# Patient Record
Sex: MALE | Race: WHITE | URBAN - METROPOLITAN AREA
[De-identification: names, ages, dates, MRNs, and addresses within clinical notes are randomized per-mention and may not be internally consistent; named-entity substitution may affect disease eponyms.]

---

## 2019-03-27 ENCOUNTER — TRANSFERRED RECORDS (OUTPATIENT)
Dept: HEALTH INFORMATION MANAGEMENT | Facility: CLINIC | Age: 64
End: 2019-03-27

## 2019-04-01 DIAGNOSIS — R91.8 PULMONARY NODULES: Primary | ICD-10-CM

## 2019-04-09 ENCOUNTER — TRANSFERRED RECORDS (OUTPATIENT)
Dept: HEALTH INFORMATION MANAGEMENT | Facility: CLINIC | Age: 64
End: 2019-04-09

## 2019-04-09 LAB
ALT SERPL-CCNC: 21 IU/L (ref 8–45)
AST SERPL-CCNC: 36 IU/L (ref 2–40)
CREAT SERPL-MCNC: 1.15 MG/DL (ref 0.72–1.25)
GFR SERPL CREATININE-BSD FRML MDRD: >60 ML/MIN/1.73M2
GLUCOSE SERPL-MCNC: 85 MG/DL (ref 65–100)
POTASSIUM SERPL-SCNC: 4 MMOL/L (ref 3.5–5)

## 2019-04-11 ENCOUNTER — HOSPITAL ENCOUNTER (OUTPATIENT)
Dept: CARDIAC REHAB | Facility: CLINIC | Age: 64
End: 2019-04-11
Attending: THORACIC SURGERY (CARDIOTHORACIC VASCULAR SURGERY)
Payer: COMMERCIAL

## 2019-04-11 DIAGNOSIS — R91.8 PULMONARY NODULES: ICD-10-CM

## 2019-04-11 PROCEDURE — 94729 DIFFUSING CAPACITY: CPT

## 2019-04-11 PROCEDURE — 40001038 ZZH STATISTIC RESPIRATORY TESTING VISIT

## 2019-04-11 PROCEDURE — 94375 RESPIRATORY FLOW VOLUME LOOP: CPT

## 2019-04-11 PROCEDURE — 94726 PLETHYSMOGRAPHY LUNG VOLUMES: CPT

## 2019-04-12 LAB
DLCOCOR-%PRED-PRE: 137 %
DLCOCOR-PRE: 36.67 ML/MIN/MMHG
DLCOUNC-%PRED-PRE: 141 %
DLCOUNC-PRE: 37.76 ML/MIN/MMHG
DLCOUNC-PRED: 26.61 ML/MIN/MMHG
ERV-%PRED-PRE: 89 %
ERV-PRE: 0.92 L
ERV-PRED: 1.02 L
EXPTIME-PRE: 7.34 SEC
FEF2575-%PRED-PRE: 82 %
FEF2575-PRE: 2.26 L/SEC
FEF2575-PRED: 2.74 L/SEC
FEFMAX-%PRED-PRE: 94 %
FEFMAX-PRE: 8.35 L/SEC
FEFMAX-PRED: 8.82 L/SEC
FEV1-%PRED-PRE: 92 %
FEV1-PRE: 3.15 L
FEV1FEV6-PRE: 74 %
FEV1FEV6-PRED: 78 %
FEV1FVC-PRE: 73 %
FEV1FVC-PRED: 77 %
FEV1SVC-PRE: 78 %
FEV1SVC-PRED: 70 %
FIFMAX-PRE: 4.62 L/SEC
FRCPLETH-%PRED-PRE: 102 %
FRCPLETH-PRE: 3.7 L
FRCPLETH-PRED: 3.62 L
FVC-%PRED-PRE: 97 %
FVC-PRE: 4.31 L
FVC-PRED: 4.42 L
IC-%PRED-PRE: 82 %
IC-PRE: 3.13 L
IC-PRED: 3.81 L
RVPLETH-%PRED-PRE: 110 %
RVPLETH-PRE: 2.77 L
RVPLETH-PRED: 2.49 L
TLCPLETH-%PRED-PRE: 96 %
TLCPLETH-PRE: 6.83 L
TLCPLETH-PRED: 7.05 L
VA-%PRED-PRE: 106 %
VA-PRE: 6.8 L
VC-%PRED-PRE: 84 %
VC-PRE: 4.06 L
VC-PRED: 4.83 L

## 2019-04-15 ENCOUNTER — TRANSFERRED RECORDS (OUTPATIENT)
Dept: HEALTH INFORMATION MANAGEMENT | Facility: CLINIC | Age: 64
End: 2019-04-15

## 2019-04-15 RX ORDER — HYDROCODONE BITARTRATE AND ACETAMINOPHEN 5; 325 MG/1; MG/1
1 TABLET ORAL EVERY 4 HOURS PRN
COMMUNITY

## 2019-04-15 RX ORDER — TRAZODONE HYDROCHLORIDE 50 MG/1
25 TABLET, FILM COATED ORAL
Status: ON HOLD | COMMUNITY
End: 2019-04-16

## 2019-04-15 RX ORDER — ASPIRIN 81 MG/1
81 TABLET ORAL DAILY
COMMUNITY

## 2019-04-15 RX ORDER — CYCLOBENZAPRINE HCL 10 MG
10 TABLET ORAL 3 TIMES DAILY PRN
COMMUNITY

## 2019-04-15 RX ORDER — METRONIDAZOLE 7.5 MG/G
GEL TOPICAL 2 TIMES DAILY
COMMUNITY

## 2019-04-15 RX ORDER — CARBIDOPA AND LEVODOPA 25; 100 MG/1; MG/1
1 TABLET ORAL 3 TIMES DAILY
COMMUNITY

## 2019-04-15 RX ORDER — ALPRAZOLAM 0.5 MG
0.5 TABLET ORAL 3 TIMES DAILY PRN
COMMUNITY

## 2019-04-15 RX ORDER — ROSUVASTATIN CALCIUM 40 MG/1
40 TABLET, COATED ORAL AT BEDTIME
COMMUNITY

## 2019-04-16 ENCOUNTER — ANESTHESIA (OUTPATIENT)
Dept: SURGERY | Facility: CLINIC | Age: 64
DRG: 165 | End: 2019-04-16
Payer: COMMERCIAL

## 2019-04-16 ENCOUNTER — HOSPITAL ENCOUNTER (INPATIENT)
Facility: CLINIC | Age: 64
LOS: 1 days | Discharge: HOME | DRG: 165 | End: 2019-04-17
Attending: THORACIC SURGERY (CARDIOTHORACIC VASCULAR SURGERY) | Admitting: THORACIC SURGERY (CARDIOTHORACIC VASCULAR SURGERY)
Payer: COMMERCIAL

## 2019-04-16 ENCOUNTER — APPOINTMENT (OUTPATIENT)
Dept: GENERAL RADIOLOGY | Facility: CLINIC | Age: 64
DRG: 165 | End: 2019-04-16
Attending: THORACIC SURGERY (CARDIOTHORACIC VASCULAR SURGERY)
Payer: COMMERCIAL

## 2019-04-16 ENCOUNTER — ANESTHESIA EVENT (OUTPATIENT)
Dept: SURGERY | Facility: CLINIC | Age: 64
DRG: 165 | End: 2019-04-16
Payer: COMMERCIAL

## 2019-04-16 DIAGNOSIS — R91.1 NODULE OF LOWER LOBE OF RIGHT LUNG: Primary | ICD-10-CM

## 2019-04-16 LAB
ABO + RH BLD: NORMAL
ABO + RH BLD: NORMAL
ANION GAP SERPL CALCULATED.3IONS-SCNC: 3 MMOL/L (ref 3–14)
BLD GP AB SCN SERPL QL: NORMAL
BLOOD BANK CMNT PATIENT-IMP: NORMAL
BUN SERPL-MCNC: 15 MG/DL (ref 7–30)
CALCIUM SERPL-MCNC: 8.9 MG/DL (ref 8.5–10.1)
CHLORIDE SERPL-SCNC: 105 MMOL/L (ref 94–109)
CO2 SERPL-SCNC: 29 MMOL/L (ref 20–32)
CREAT SERPL-MCNC: 0.86 MG/DL (ref 0.66–1.25)
ERYTHROCYTE [DISTWIDTH] IN BLOOD BY AUTOMATED COUNT: 12.7 % (ref 10–15)
GFR SERPL CREATININE-BSD FRML MDRD: >90 ML/MIN/{1.73_M2}
GLUCOSE SERPL-MCNC: 93 MG/DL (ref 70–99)
HCT VFR BLD AUTO: 43.3 % (ref 40–53)
HGB BLD-MCNC: 14.9 G/DL (ref 13.3–17.7)
INR PPP: 1 (ref 0.86–1.14)
KOH PREP SPEC: NORMAL
MCH RBC QN AUTO: 33.9 PG (ref 26.5–33)
MCHC RBC AUTO-ENTMCNC: 34.4 G/DL (ref 31.5–36.5)
MCV RBC AUTO: 99 FL (ref 78–100)
PLATELET # BLD AUTO: 203 10E9/L (ref 150–450)
POTASSIUM SERPL-SCNC: 4.4 MMOL/L (ref 3.4–5.3)
RBC # BLD AUTO: 4.39 10E12/L (ref 4.4–5.9)
SODIUM SERPL-SCNC: 137 MMOL/L (ref 133–144)
SPECIMEN EXP DATE BLD: NORMAL
SPECIMEN SOURCE: NORMAL
WBC # BLD AUTO: 6.6 10E9/L (ref 4–11)

## 2019-04-16 PROCEDURE — 86900 BLOOD TYPING SEROLOGIC ABO: CPT | Performed by: THORACIC SURGERY (CARDIOTHORACIC VASCULAR SURGERY)

## 2019-04-16 PROCEDURE — 37000008 ZZH ANESTHESIA TECHNICAL FEE, 1ST 30 MIN: Performed by: THORACIC SURGERY (CARDIOTHORACIC VASCULAR SURGERY)

## 2019-04-16 PROCEDURE — 87015 SPECIMEN INFECT AGNT CONCNTJ: CPT | Performed by: THORACIC SURGERY (CARDIOTHORACIC VASCULAR SURGERY)

## 2019-04-16 PROCEDURE — 25000125 ZZHC RX 250: Performed by: THORACIC SURGERY (CARDIOTHORACIC VASCULAR SURGERY)

## 2019-04-16 PROCEDURE — 71000013 ZZH RECOVERY PHASE 1 LEVEL 1 EA ADDTL HR: Performed by: THORACIC SURGERY (CARDIOTHORACIC VASCULAR SURGERY)

## 2019-04-16 PROCEDURE — 36415 COLL VENOUS BLD VENIPUNCTURE: CPT | Performed by: THORACIC SURGERY (CARDIOTHORACIC VASCULAR SURGERY)

## 2019-04-16 PROCEDURE — 25000132 ZZH RX MED GY IP 250 OP 250 PS 637: Performed by: PHYSICIAN ASSISTANT

## 2019-04-16 PROCEDURE — 88331 PATH CONSLTJ SURG 1 BLK 1SPC: CPT | Performed by: THORACIC SURGERY (CARDIOTHORACIC VASCULAR SURGERY)

## 2019-04-16 PROCEDURE — 12000000 ZZH R&B MED SURG/OB

## 2019-04-16 PROCEDURE — 88307 TISSUE EXAM BY PATHOLOGIST: CPT | Mod: 26 | Performed by: THORACIC SURGERY (CARDIOTHORACIC VASCULAR SURGERY)

## 2019-04-16 PROCEDURE — 25000128 H RX IP 250 OP 636: Performed by: ANESTHESIOLOGY

## 2019-04-16 PROCEDURE — 88312 SPECIAL STAINS GROUP 1: CPT | Mod: 26,59 | Performed by: THORACIC SURGERY (CARDIOTHORACIC VASCULAR SURGERY)

## 2019-04-16 PROCEDURE — 36000063 ZZH SURGERY LEVEL 4 EA 15 ADDTL MIN: Performed by: THORACIC SURGERY (CARDIOTHORACIC VASCULAR SURGERY)

## 2019-04-16 PROCEDURE — 25000128 H RX IP 250 OP 636: Performed by: PHYSICIAN ASSISTANT

## 2019-04-16 PROCEDURE — 40000169 ZZH STATISTIC PRE-PROCEDURE ASSESSMENT I: Performed by: THORACIC SURGERY (CARDIOTHORACIC VASCULAR SURGERY)

## 2019-04-16 PROCEDURE — 0BBF4ZZ EXCISION OF RIGHT LOWER LUNG LOBE, PERCUTANEOUS ENDOSCOPIC APPROACH: ICD-10-PCS | Performed by: THORACIC SURGERY (CARDIOTHORACIC VASCULAR SURGERY)

## 2019-04-16 PROCEDURE — 86901 BLOOD TYPING SEROLOGIC RH(D): CPT | Performed by: THORACIC SURGERY (CARDIOTHORACIC VASCULAR SURGERY)

## 2019-04-16 PROCEDURE — 87102 FUNGUS ISOLATION CULTURE: CPT | Performed by: THORACIC SURGERY (CARDIOTHORACIC VASCULAR SURGERY)

## 2019-04-16 PROCEDURE — 27210794 ZZH OR GENERAL SUPPLY STERILE: Performed by: THORACIC SURGERY (CARDIOTHORACIC VASCULAR SURGERY)

## 2019-04-16 PROCEDURE — 25000566 ZZH SEVOFLURANE, EA 15 MIN: Performed by: THORACIC SURGERY (CARDIOTHORACIC VASCULAR SURGERY)

## 2019-04-16 PROCEDURE — 25000128 H RX IP 250 OP 636: Performed by: THORACIC SURGERY (CARDIOTHORACIC VASCULAR SURGERY)

## 2019-04-16 PROCEDURE — 25000125 ZZHC RX 250: Performed by: NURSE ANESTHETIST, CERTIFIED REGISTERED

## 2019-04-16 PROCEDURE — 87206 SMEAR FLUORESCENT/ACID STAI: CPT | Performed by: THORACIC SURGERY (CARDIOTHORACIC VASCULAR SURGERY)

## 2019-04-16 PROCEDURE — 25000128 H RX IP 250 OP 636: Performed by: NURSE ANESTHETIST, CERTIFIED REGISTERED

## 2019-04-16 PROCEDURE — 37000009 ZZH ANESTHESIA TECHNICAL FEE, EACH ADDTL 15 MIN: Performed by: THORACIC SURGERY (CARDIOTHORACIC VASCULAR SURGERY)

## 2019-04-16 PROCEDURE — 25800030 ZZH RX IP 258 OP 636: Performed by: NURSE ANESTHETIST, CERTIFIED REGISTERED

## 2019-04-16 PROCEDURE — 88307 TISSUE EXAM BY PATHOLOGIST: CPT | Performed by: THORACIC SURGERY (CARDIOTHORACIC VASCULAR SURGERY)

## 2019-04-16 PROCEDURE — 25000132 ZZH RX MED GY IP 250 OP 250 PS 637: Performed by: THORACIC SURGERY (CARDIOTHORACIC VASCULAR SURGERY)

## 2019-04-16 PROCEDURE — 88312 SPECIAL STAINS GROUP 1: CPT | Performed by: THORACIC SURGERY (CARDIOTHORACIC VASCULAR SURGERY)

## 2019-04-16 PROCEDURE — 40000986 XR CHEST PORT 1 VW

## 2019-04-16 PROCEDURE — 87210 SMEAR WET MOUNT SALINE/INK: CPT | Performed by: THORACIC SURGERY (CARDIOTHORACIC VASCULAR SURGERY)

## 2019-04-16 PROCEDURE — 25000132 ZZH RX MED GY IP 250 OP 250 PS 637: Performed by: ANESTHESIOLOGY

## 2019-04-16 PROCEDURE — 87116 MYCOBACTERIA CULTURE: CPT | Performed by: THORACIC SURGERY (CARDIOTHORACIC VASCULAR SURGERY)

## 2019-04-16 PROCEDURE — 80048 BASIC METABOLIC PNL TOTAL CA: CPT | Performed by: THORACIC SURGERY (CARDIOTHORACIC VASCULAR SURGERY)

## 2019-04-16 PROCEDURE — 85027 COMPLETE CBC AUTOMATED: CPT | Performed by: THORACIC SURGERY (CARDIOTHORACIC VASCULAR SURGERY)

## 2019-04-16 PROCEDURE — 85610 PROTHROMBIN TIME: CPT | Performed by: THORACIC SURGERY (CARDIOTHORACIC VASCULAR SURGERY)

## 2019-04-16 PROCEDURE — 71000012 ZZH RECOVERY PHASE 1 LEVEL 1 FIRST HR: Performed by: THORACIC SURGERY (CARDIOTHORACIC VASCULAR SURGERY)

## 2019-04-16 PROCEDURE — 88331 PATH CONSLTJ SURG 1 BLK 1SPC: CPT | Mod: 26 | Performed by: THORACIC SURGERY (CARDIOTHORACIC VASCULAR SURGERY)

## 2019-04-16 PROCEDURE — 86850 RBC ANTIBODY SCREEN: CPT | Performed by: THORACIC SURGERY (CARDIOTHORACIC VASCULAR SURGERY)

## 2019-04-16 PROCEDURE — 36000093 ZZH SURGERY LEVEL 4 1ST 30 MIN: Performed by: THORACIC SURGERY (CARDIOTHORACIC VASCULAR SURGERY)

## 2019-04-16 RX ORDER — KETOROLAC TROMETHAMINE 30 MG/ML
30 INJECTION, SOLUTION INTRAMUSCULAR; INTRAVENOUS EVERY 6 HOURS
Status: DISCONTINUED | OUTPATIENT
Start: 2019-04-16 | End: 2019-04-17 | Stop reason: HOSPADM

## 2019-04-16 RX ORDER — AMOXICILLIN 250 MG
2 CAPSULE ORAL 2 TIMES DAILY PRN
Status: DISCONTINUED | OUTPATIENT
Start: 2019-04-16 | End: 2019-04-17 | Stop reason: HOSPADM

## 2019-04-16 RX ORDER — SODIUM CHLORIDE, SODIUM LACTATE, POTASSIUM CHLORIDE, CALCIUM CHLORIDE 600; 310; 30; 20 MG/100ML; MG/100ML; MG/100ML; MG/100ML
INJECTION, SOLUTION INTRAVENOUS CONTINUOUS
Status: DISCONTINUED | OUTPATIENT
Start: 2019-04-16 | End: 2019-04-16 | Stop reason: HOSPADM

## 2019-04-16 RX ORDER — AMOXICILLIN 250 MG
1 CAPSULE ORAL 2 TIMES DAILY
Status: DISCONTINUED | OUTPATIENT
Start: 2019-04-16 | End: 2019-04-17 | Stop reason: HOSPADM

## 2019-04-16 RX ORDER — HYDROMORPHONE HYDROCHLORIDE 1 MG/ML
.3-.5 INJECTION, SOLUTION INTRAMUSCULAR; INTRAVENOUS; SUBCUTANEOUS
Status: DISCONTINUED | OUTPATIENT
Start: 2019-04-16 | End: 2019-04-17 | Stop reason: HOSPADM

## 2019-04-16 RX ORDER — FENTANYL CITRATE 50 UG/ML
50-100 INJECTION, SOLUTION INTRAMUSCULAR; INTRAVENOUS
Status: DISCONTINUED | OUTPATIENT
Start: 2019-04-16 | End: 2019-04-16 | Stop reason: HOSPADM

## 2019-04-16 RX ORDER — VECURONIUM BROMIDE 1 MG/ML
INJECTION, POWDER, LYOPHILIZED, FOR SOLUTION INTRAVENOUS PRN
Status: DISCONTINUED | OUTPATIENT
Start: 2019-04-16 | End: 2019-04-16

## 2019-04-16 RX ORDER — ACETAMINOPHEN 325 MG/1
975 TABLET ORAL EVERY 8 HOURS
Status: DISCONTINUED | OUTPATIENT
Start: 2019-04-16 | End: 2019-04-17 | Stop reason: HOSPADM

## 2019-04-16 RX ORDER — SODIUM CHLORIDE, SODIUM LACTATE, POTASSIUM CHLORIDE, CALCIUM CHLORIDE 600; 310; 30; 20 MG/100ML; MG/100ML; MG/100ML; MG/100ML
INJECTION, SOLUTION INTRAVENOUS CONTINUOUS PRN
Status: DISCONTINUED | OUTPATIENT
Start: 2019-04-16 | End: 2019-04-16

## 2019-04-16 RX ORDER — LIDOCAINE HYDROCHLORIDE 20 MG/ML
INJECTION, SOLUTION INFILTRATION; PERINEURAL PRN
Status: DISCONTINUED | OUTPATIENT
Start: 2019-04-16 | End: 2019-04-16

## 2019-04-16 RX ORDER — GINSENG 100 MG
CAPSULE ORAL 3 TIMES DAILY
Status: DISCONTINUED | OUTPATIENT
Start: 2019-04-16 | End: 2019-04-17 | Stop reason: HOSPADM

## 2019-04-16 RX ORDER — CHLORAL HYDRATE 500 MG
1 CAPSULE ORAL DAILY
COMMUNITY

## 2019-04-16 RX ORDER — AMOXICILLIN 250 MG
1 CAPSULE ORAL 2 TIMES DAILY PRN
Status: DISCONTINUED | OUTPATIENT
Start: 2019-04-16 | End: 2019-04-17 | Stop reason: HOSPADM

## 2019-04-16 RX ORDER — ONDANSETRON 4 MG/1
4 TABLET, ORALLY DISINTEGRATING ORAL EVERY 30 MIN PRN
Status: DISCONTINUED | OUTPATIENT
Start: 2019-04-16 | End: 2019-04-16 | Stop reason: HOSPADM

## 2019-04-16 RX ORDER — ONDANSETRON 2 MG/ML
4 INJECTION INTRAMUSCULAR; INTRAVENOUS EVERY 6 HOURS PRN
Status: DISCONTINUED | OUTPATIENT
Start: 2019-04-16 | End: 2019-04-17 | Stop reason: HOSPADM

## 2019-04-16 RX ORDER — LIDOCAINE 40 MG/G
CREAM TOPICAL
Status: DISCONTINUED | OUTPATIENT
Start: 2019-04-16 | End: 2019-04-17 | Stop reason: HOSPADM

## 2019-04-16 RX ORDER — CYCLOBENZAPRINE HCL 10 MG
10 TABLET ORAL 3 TIMES DAILY PRN
Status: DISCONTINUED | OUTPATIENT
Start: 2019-04-16 | End: 2019-04-17 | Stop reason: HOSPADM

## 2019-04-16 RX ORDER — NALOXONE HYDROCHLORIDE 0.4 MG/ML
.1-.4 INJECTION, SOLUTION INTRAMUSCULAR; INTRAVENOUS; SUBCUTANEOUS
Status: DISCONTINUED | OUTPATIENT
Start: 2019-04-16 | End: 2019-04-17 | Stop reason: HOSPADM

## 2019-04-16 RX ORDER — DIPHENHYDRAMINE HCL 25 MG
25 CAPSULE ORAL EVERY 6 HOURS PRN
Status: DISCONTINUED | OUTPATIENT
Start: 2019-04-16 | End: 2019-04-17 | Stop reason: HOSPADM

## 2019-04-16 RX ORDER — ONDANSETRON 4 MG/1
4 TABLET, ORALLY DISINTEGRATING ORAL EVERY 6 HOURS PRN
Status: DISCONTINUED | OUTPATIENT
Start: 2019-04-16 | End: 2019-04-17 | Stop reason: HOSPADM

## 2019-04-16 RX ORDER — DIPHENHYDRAMINE HYDROCHLORIDE 50 MG/ML
25 INJECTION INTRAMUSCULAR; INTRAVENOUS EVERY 6 HOURS PRN
Status: DISCONTINUED | OUTPATIENT
Start: 2019-04-16 | End: 2019-04-17 | Stop reason: HOSPADM

## 2019-04-16 RX ORDER — UBIDECARENONE 100 MG
200 CAPSULE ORAL AT BEDTIME
Status: DISCONTINUED | OUTPATIENT
Start: 2019-04-16 | End: 2019-04-17 | Stop reason: HOSPADM

## 2019-04-16 RX ORDER — BISACODYL 10 MG
10 SUPPOSITORY, RECTAL RECTAL DAILY PRN
Status: DISCONTINUED | OUTPATIENT
Start: 2019-04-16 | End: 2019-04-17 | Stop reason: HOSPADM

## 2019-04-16 RX ORDER — CARBIDOPA AND LEVODOPA 25; 100 MG/1; MG/1
1 TABLET ORAL ONCE
Status: COMPLETED | OUTPATIENT
Start: 2019-04-16 | End: 2019-04-16

## 2019-04-16 RX ORDER — SODIUM CHLORIDE 9 MG/ML
INJECTION, SOLUTION INTRAVENOUS CONTINUOUS
Status: DISCONTINUED | OUTPATIENT
Start: 2019-04-16 | End: 2019-04-17 | Stop reason: HOSPADM

## 2019-04-16 RX ORDER — ACETAMINOPHEN 325 MG/1
650 TABLET ORAL EVERY 4 HOURS PRN
Status: DISCONTINUED | OUTPATIENT
Start: 2019-04-19 | End: 2019-04-17 | Stop reason: HOSPADM

## 2019-04-16 RX ORDER — CARBOXYMETHYLCELLULOSE SODIUM 5 MG/ML
1 SOLUTION/ DROPS OPHTHALMIC EVERY 6 HOURS PRN
Status: DISCONTINUED | OUTPATIENT
Start: 2019-04-16 | End: 2019-04-17 | Stop reason: HOSPADM

## 2019-04-16 RX ORDER — MAGNESIUM HYDROXIDE 1200 MG/15ML
LIQUID ORAL PRN
Status: DISCONTINUED | OUTPATIENT
Start: 2019-04-16 | End: 2019-04-16 | Stop reason: HOSPADM

## 2019-04-16 RX ORDER — PROPOFOL 10 MG/ML
INJECTION, EMULSION INTRAVENOUS PRN
Status: DISCONTINUED | OUTPATIENT
Start: 2019-04-16 | End: 2019-04-16

## 2019-04-16 RX ORDER — PROCHLORPERAZINE MALEATE 10 MG
10 TABLET ORAL EVERY 6 HOURS PRN
Status: DISCONTINUED | OUTPATIENT
Start: 2019-04-16 | End: 2019-04-16

## 2019-04-16 RX ORDER — CALCIUM CARBONATE 500 MG/1
1000 TABLET, CHEWABLE ORAL 4 TIMES DAILY PRN
Status: DISCONTINUED | OUTPATIENT
Start: 2019-04-16 | End: 2019-04-17 | Stop reason: HOSPADM

## 2019-04-16 RX ORDER — ROSUVASTATIN CALCIUM 20 MG/1
40 TABLET, COATED ORAL AT BEDTIME
Status: DISCONTINUED | OUTPATIENT
Start: 2019-04-16 | End: 2019-04-17 | Stop reason: HOSPADM

## 2019-04-16 RX ORDER — POLYETHYLENE GLYCOL 3350 17 G/17G
17 POWDER, FOR SOLUTION ORAL DAILY PRN
Status: DISCONTINUED | OUTPATIENT
Start: 2019-04-16 | End: 2019-04-17 | Stop reason: HOSPADM

## 2019-04-16 RX ORDER — AMOXICILLIN 250 MG
2 CAPSULE ORAL 2 TIMES DAILY
Status: DISCONTINUED | OUTPATIENT
Start: 2019-04-16 | End: 2019-04-17 | Stop reason: HOSPADM

## 2019-04-16 RX ORDER — ASPIRIN 81 MG/1
81 TABLET ORAL DAILY
Status: DISCONTINUED | OUTPATIENT
Start: 2019-04-16 | End: 2019-04-17 | Stop reason: HOSPADM

## 2019-04-16 RX ORDER — CEFAZOLIN SODIUM 2 G/100ML
2 INJECTION, SOLUTION INTRAVENOUS
Status: COMPLETED | OUTPATIENT
Start: 2019-04-16 | End: 2019-04-16

## 2019-04-16 RX ORDER — HYDROMORPHONE HYDROCHLORIDE 1 MG/ML
.3-.5 INJECTION, SOLUTION INTRAMUSCULAR; INTRAVENOUS; SUBCUTANEOUS EVERY 5 MIN PRN
Status: DISCONTINUED | OUTPATIENT
Start: 2019-04-16 | End: 2019-04-16 | Stop reason: HOSPADM

## 2019-04-16 RX ORDER — ONDANSETRON 2 MG/ML
4 INJECTION INTRAMUSCULAR; INTRAVENOUS EVERY 30 MIN PRN
Status: DISCONTINUED | OUTPATIENT
Start: 2019-04-16 | End: 2019-04-16 | Stop reason: HOSPADM

## 2019-04-16 RX ORDER — ALPRAZOLAM 0.5 MG
0.5 TABLET ORAL 3 TIMES DAILY PRN
Status: DISCONTINUED | OUTPATIENT
Start: 2019-04-16 | End: 2019-04-17 | Stop reason: HOSPADM

## 2019-04-16 RX ORDER — FENTANYL CITRATE 50 UG/ML
25-50 INJECTION, SOLUTION INTRAMUSCULAR; INTRAVENOUS
Status: DISCONTINUED | OUTPATIENT
Start: 2019-04-16 | End: 2019-04-16 | Stop reason: HOSPADM

## 2019-04-16 RX ORDER — EPHEDRINE SULFATE 50 MG/ML
INJECTION, SOLUTION INTRAMUSCULAR; INTRAVENOUS; SUBCUTANEOUS PRN
Status: DISCONTINUED | OUTPATIENT
Start: 2019-04-16 | End: 2019-04-16

## 2019-04-16 RX ORDER — NALOXONE HYDROCHLORIDE 0.4 MG/ML
.1-.4 INJECTION, SOLUTION INTRAMUSCULAR; INTRAVENOUS; SUBCUTANEOUS
Status: DISCONTINUED | OUTPATIENT
Start: 2019-04-16 | End: 2019-04-16

## 2019-04-16 RX ORDER — CEFAZOLIN SODIUM 1 G/3ML
1 INJECTION, POWDER, FOR SOLUTION INTRAMUSCULAR; INTRAVENOUS SEE ADMIN INSTRUCTIONS
Status: DISCONTINUED | OUTPATIENT
Start: 2019-04-16 | End: 2019-04-16 | Stop reason: HOSPADM

## 2019-04-16 RX ORDER — HYDROMORPHONE HYDROCHLORIDE 2 MG/1
2-4 TABLET ORAL
Status: DISCONTINUED | OUTPATIENT
Start: 2019-04-16 | End: 2019-04-17 | Stop reason: HOSPADM

## 2019-04-16 RX ORDER — METRONIDAZOLE 7.5 MG/G
GEL TOPICAL 2 TIMES DAILY
Status: DISCONTINUED | OUTPATIENT
Start: 2019-04-16 | End: 2019-04-17 | Stop reason: HOSPADM

## 2019-04-16 RX ORDER — FENTANYL CITRATE 50 UG/ML
INJECTION, SOLUTION INTRAMUSCULAR; INTRAVENOUS PRN
Status: DISCONTINUED | OUTPATIENT
Start: 2019-04-16 | End: 2019-04-16

## 2019-04-16 RX ORDER — CARBIDOPA AND LEVODOPA 25; 100 MG/1; MG/1
1 TABLET ORAL 3 TIMES DAILY
Status: DISCONTINUED | OUTPATIENT
Start: 2019-04-16 | End: 2019-04-17 | Stop reason: HOSPADM

## 2019-04-16 RX ADMIN — VECURONIUM BROMIDE 2 MG: 1 INJECTION, POWDER, LYOPHILIZED, FOR SOLUTION INTRAVENOUS at 14:16

## 2019-04-16 RX ADMIN — CEFAZOLIN SODIUM 2 G: 2 INJECTION, SOLUTION INTRAVENOUS at 13:47

## 2019-04-16 RX ADMIN — SUGAMMADEX 200 MG: 100 INJECTION, SOLUTION INTRAVENOUS at 14:48

## 2019-04-16 RX ADMIN — RANITIDINE 150 MG: 150 TABLET ORAL at 21:15

## 2019-04-16 RX ADMIN — SENNOSIDES AND DOCUSATE SODIUM 2 TABLET: 8.6; 5 TABLET ORAL at 21:15

## 2019-04-16 RX ADMIN — FENTANYL CITRATE 100 MCG: 50 INJECTION, SOLUTION INTRAMUSCULAR; INTRAVENOUS at 13:47

## 2019-04-16 RX ADMIN — ACETAMINOPHEN 975 MG: 325 TABLET, FILM COATED ORAL at 21:14

## 2019-04-16 RX ADMIN — Medication 200 MG: at 21:15

## 2019-04-16 RX ADMIN — FENTANYL CITRATE 50 MCG: 50 INJECTION, SOLUTION INTRAMUSCULAR; INTRAVENOUS at 14:04

## 2019-04-16 RX ADMIN — HYDROMORPHONE HYDROCHLORIDE 0.5 MG: 1 INJECTION, SOLUTION INTRAMUSCULAR; INTRAVENOUS; SUBCUTANEOUS at 15:51

## 2019-04-16 RX ADMIN — HYDROMORPHONE HYDROCHLORIDE 2 MG: 2 TABLET ORAL at 17:47

## 2019-04-16 RX ADMIN — ROCURONIUM BROMIDE 50 MG: 10 INJECTION INTRAVENOUS at 13:47

## 2019-04-16 RX ADMIN — ASPIRIN 81 MG: 81 TABLET, COATED ORAL at 17:39

## 2019-04-16 RX ADMIN — PROPOFOL 230 MG: 10 INJECTION, EMULSION INTRAVENOUS at 13:47

## 2019-04-16 RX ADMIN — CARBIDOPA AND LEVODOPA 1 TABLET: 25; 100 TABLET ORAL at 21:14

## 2019-04-16 RX ADMIN — HYDROMORPHONE HYDROCHLORIDE 0.5 MG: 1 INJECTION, SOLUTION INTRAMUSCULAR; INTRAVENOUS; SUBCUTANEOUS at 16:04

## 2019-04-16 RX ADMIN — MIDAZOLAM 2 MG: 1 INJECTION INTRAMUSCULAR; INTRAVENOUS at 13:38

## 2019-04-16 RX ADMIN — PROPOFOL 40 MG: 10 INJECTION, EMULSION INTRAVENOUS at 14:06

## 2019-04-16 RX ADMIN — Medication 5 MG: at 14:19

## 2019-04-16 RX ADMIN — SODIUM CHLORIDE, POTASSIUM CHLORIDE, SODIUM LACTATE AND CALCIUM CHLORIDE: 600; 310; 30; 20 INJECTION, SOLUTION INTRAVENOUS at 14:28

## 2019-04-16 RX ADMIN — SODIUM CHLORIDE, POTASSIUM CHLORIDE, SODIUM LACTATE AND CALCIUM CHLORIDE: 600; 310; 30; 20 INJECTION, SOLUTION INTRAVENOUS at 13:10

## 2019-04-16 RX ADMIN — HYDROMORPHONE HYDROCHLORIDE 4 MG: 2 TABLET ORAL at 21:15

## 2019-04-16 RX ADMIN — LIDOCAINE HYDROCHLORIDE 100 MG: 20 INJECTION, SOLUTION INFILTRATION; PERINEURAL at 13:47

## 2019-04-16 RX ADMIN — KETOROLAC TROMETHAMINE 30 MG: 30 INJECTION, SOLUTION INTRAMUSCULAR; INTRAVENOUS at 15:39

## 2019-04-16 RX ADMIN — CARBIDOPA AND LEVODOPA 1 TABLET: 25; 100 TABLET ORAL at 15:36

## 2019-04-16 RX ADMIN — ROSUVASTATIN CALCIUM 40 MG: 20 TABLET, FILM COATED ORAL at 21:15

## 2019-04-16 RX ADMIN — Medication 1 LOZENGE: at 21:40

## 2019-04-16 ASSESSMENT — LIFESTYLE VARIABLES: TOBACCO_USE: 0

## 2019-04-16 ASSESSMENT — MIFFLIN-ST. JEOR: SCORE: 1698.46

## 2019-04-16 ASSESSMENT — ACTIVITIES OF DAILY LIVING (ADL): ADLS_ACUITY_SCORE: 12

## 2019-04-16 ASSESSMENT — COPD QUESTIONNAIRES: COPD: 0

## 2019-04-16 NOTE — ANESTHESIA PREPROCEDURE EVALUATION
Anesthesia Pre-Procedure Evaluation    Patient: Jonathan Lawson   MRN: [id number] : 1955          Preoperative Diagnosis: RIGHT LOWER LOBE LUNG NODULE    Procedure(s):  RIGHT VIDEO ASSISTED THORACOSCOPIC WEDGE RESECTION, RIGHT LOWER LUNG NODULE  POSSIBLE RIGHT THORACOTOMY  POSSIBLE RIGHT LOWER LOBECTOMY    Past Medical History:   Diagnosis Date     Arthritis      Chronic sinusitis      Disc disorder of cervical region      Hx of gout      Hyperlipidemia      Lumbago      Lung nodule     RLL     Parkinson's disease (H)      Past Surgical History:   Procedure Laterality Date     BACK SURGERY      Cervical 6-7 fusion     ENT SURGERY      cataract removal bilaterally     ORTHOPEDIC SURGERY      right knee partial replacement     ORTHOPEDIC SURGERY      bilateral knee arthroscopy     ORTHOPEDIC SURGERY      right foot big toe and 2nd joint fused       Anesthesia Evaluation     . Pt has had prior anesthetic. Type: General    No history of anesthetic complications          ROS/MED HX    ENT/Pulmonary:      (-) tobacco use, asthma, COPD, sleep apnea and recent URI   Neurologic: Comment: Hx of neck surgery    (+)Parkinson's disease     Cardiovascular:     (+) Dyslipidemia, ----. : . . . :. .      (-) CAD and CHF   METS/Exercise Tolerance:  >4 METS   Hematologic:         Musculoskeletal:         GI/Hepatic:        (-) GERD and liver disease   Renal/Genitourinary:      (-) renal disease   Endo:     (+) Other Endocrine Disorder gout.   (-) Type I DM and Type II DM   Psychiatric:         Infectious Disease:         Malignancy:         Other:                          Physical Exam  Normal systems: cardiovascular, pulmonary and dental    Airway   Mallampati: II  TM distance: >3 FB  Neck ROM: full    Dental     Cardiovascular       Pulmonary             Lab Results   Component Value Date    WBC 5.0 2016    HGB 15.4 2016    HCT 44.3 2016     2016     2016    POTASSIUM 3.7  "11/29/2016    CHLORIDE 104 11/29/2016    CO2 26 11/29/2016    BUN 14 11/29/2016    CR 0.86 11/29/2016    GLC 98 11/29/2016    MALAIKA 8.5 11/29/2016    ALBUMIN 4.1 11/29/2016    PROTTOTAL 7.6 11/29/2016    ALT 51 11/29/2016    AST 23 11/29/2016    ALKPHOS 55 11/29/2016    BILITOTAL 1.3 11/29/2016       Preop Vitals  BP Readings from Last 3 Encounters:   11/29/16 119/78    Pulse Readings from Last 3 Encounters:   No data found for Pulse      Resp Readings from Last 3 Encounters:   11/29/16 12    SpO2 Readings from Last 3 Encounters:   11/29/16 96%      Temp Readings from Last 1 Encounters:   11/29/16 36.9  C (98.4  F) (Oral)    Ht Readings from Last 1 Encounters:   11/29/16 1.778 m (5' 10\")      Wt Readings from Last 1 Encounters:   11/29/16 83.9 kg (185 lb)    Estimated body mass index is 26.54 kg/m  as calculated from the following:    Height as of 11/29/16: 1.778 m (5' 10\").    Weight as of 11/29/16: 83.9 kg (185 lb).       Anesthesia Plan      History & Physical Review  History and physical reviewed and following examination; no interval change.    ASA Status:  2 .    NPO Status:  > 8 hours    Plan for General and ETT with Intravenous induction. Maintenance will be Inhalation.    PONV prophylaxis:  Ondansetron (or other 5HT-3) and Dexamethasone or Solumedrol  Additional equipment: Double Lumen ETT      Postoperative Care  Postoperative pain management:  Multi-modal analgesia.      Consents  Anesthetic plan, risks, benefits and alternatives discussed with:  Patient..                 Aleexi Michael MD  "

## 2019-04-16 NOTE — ANESTHESIA POSTPROCEDURE EVALUATION
Patient: Jonathan Lawson    Procedure(s):  RIGHT VIDEO ASSISTED THORACOSCOPIC WEDGE RESECTION, RIGHT LOWER LUNG NODULE    Diagnosis:RIGHT LOWER LOBE LUNG NODULE  Diagnosis Additional Information: No value filed.    Anesthesia Type:  General, ETT    Note:  Anesthesia Post Evaluation    Patient location during evaluation: PACU  Patient participation: Able to fully participate in evaluation  Level of consciousness: awake and alert  Pain management: adequate  Airway patency: patent  Cardiovascular status: acceptable  Respiratory status: acceptable  Hydration status: acceptable  PONV: none     Anesthetic complications: None          Last vitals:  Vitals:    04/16/19 1615 04/16/19 1630 04/16/19 1700   BP: (!) 171/109 (!) 157/97 136/90   Pulse: 62 63 69   Resp: 16 14 13   Temp:      SpO2: 91% 98% 97%         Electronically Signed By: Alexei Michael MD  April 16, 2019  5:20 PM

## 2019-04-16 NOTE — BRIEF OP NOTE
[facility]    Brief Operative Note    Pre-operative diagnosis: RIGHT LOWER LOBE LUNG NODULE  Post-operative diagnosis right lower lobe lung nodule  Procedure:   Right video-assisted thoracoscopy, wedge resection of right lower lobe lung nodule  Surgeon: Surgeon(s) and Role:     * Felix Wagner MD - Primary     * Lidia Sinclair PA-C - Assisting  Anesthesia: General   Estimated blood loss: 5 cc  Drains:  24 Chest tube to right apex  Specimens:   ID Type Source Tests Collected by Time Destination   1 : RIGHT LOWER LOBE LUNG NODULE Tissue Lung, Right Lower Lobe AFB CULTURE NON BLOOD, AFB STAIN NON BLOOD, FUNGUS CULTURE, JEANETTE PREP Felix Wagner MD 4/16/2019  2:24 PM    A : RIGHT LOWER LOBE LUNG NODULE Tissue Lung, Right Lower Lobe SURGICAL PATHOLOGY EXAM Felix Wagner MD 4/16/2019  2:22 PM      Findings:   Frozen section most consistent with benign granuloma-- await final path, stains and cultures  Complications: None.  Implants:  * No implants in log *

## 2019-04-16 NOTE — ANESTHESIA CARE TRANSFER NOTE
Patient: Jonathan Lawson    Procedure(s):  RIGHT VIDEO ASSISTED THORACOSCOPIC WEDGE RESECTION, RIGHT LOWER LUNG NODULE    Diagnosis: RIGHT LOWER LOBE LUNG NODULE  Diagnosis Additional Information: No value filed.    Anesthesia Type:   General, ETT     Note:  Airway :Face Mask  Patient transferred to:PACU  Comments: Extubated awake in OR, exchanging well, to recovery, VSSHandoff Report: Identifed the Patient, Identified the Reponsible Provider, Reviewed the pertinent medical history, Discussed the surgical course, Reviewed Intra-OP anesthesia mangement and issues during anesthesia, Set expectations for post-procedure period and Allowed opportunity for questions and acknowledgement of understanding      Vitals: (Last set prior to Anesthesia Care Transfer)    CRNA VITALS  4/16/2019 1422 - 4/16/2019 1459      4/16/2019             Pulse:  77    SpO2:  100 %    Resp Rate (observed):  15                Electronically Signed By: MANJIT Gan CRNA  April 16, 2019  2:59 PM

## 2019-04-16 NOTE — PLAN OF CARE
A/O x4. AVSS on RA. Tele NSR. Up w/ SBA. Pain managed w/ PRN PO dilaudid.  Right chest sites x3, steri strips. CT to -20 suction, no AL, no crepitus. CT to be clamped at 0000 if still no AL per order. Tolerating regular diet. Voiding. Spouse at bedside.

## 2019-04-16 NOTE — PROGRESS NOTES
Admission medication history interview status for the 4/16/2019  admission is complete. See EPIC admission navigator for prior to admission medications     Medication history source reliability:Good    Medication history interview source(s):Patient    Medication history resources (including written lists, pill bottles, clinic record):None    Primary [facility]. [facility] ([address]).    Additional medication history information not noted on PTA med list :None    Time spent in this activity: 30 minutes    Prior to Admission medications    Medication Sig Last Dose Taking? Auth Provider   ALPRAZolam (XANAX) 0.5 MG tablet Take 0.5 mg by mouth 3 times daily as needed for anxiety 4/14/2019 Yes Reported, Patient   aspirin 81 MG EC tablet Take 81 mg by mouth daily 4/9/2019 Yes Reported, Patient   carbidopa-levodopa (SINEMET)  MG tablet Take 1 tablet by mouth 3 times daily 4/16/2019 at 0830 Yes Reported, Patient   Carboxymethylcellulose Sodium (REFRESH TEARS OP) Apply 1 drop to eye every 6 hours as needed 4/16/2019 at 0800 Yes Reported, Patient   cholecalciferol (VITAMIN D3) 5000 units TABS tablet Take 5,000 Units by mouth daily 4/15/2019 at 2100 Yes Reported, Patient   Coenzyme Q10 (CO Q 10 PO) Take 1 tablet by mouth daily 4/15/2019 at 2100 Yes Reported, Patient   cyclobenzaprine (FLEXERIL) 10 MG tablet Take 10 mg by mouth 3 times daily as needed for muscle spasms 4/15/2019 at 1800 Yes Reported, Patient   diclofenac (VOLTAREN) 1 % topical gel Place onto the skin 2 times daily 4/14/2019 Yes Reported, Patient   fish oil-omega-3 fatty acids 1000 MG capsule Take 1 g by mouth daily A month ago Yes Reported, Patient   HYDROcodone-acetaminophen (NORCO) 5-325 MG tablet Take 1 tablet by mouth every 4 hours as needed for severe pain 4/15/2019 at 2300 Yes Reported, Patient   metroNIDAZOLE (METROGEL) 0.75 % external gel Apply topically 2 times daily 4/15/2019 at 0830 Yes Reported, Patient   rosuvastatin (CRESTOR)  40 MG tablet Take 40 mg by mouth At Bedtime 4/15/2019 at 2100 Yes Reported, Patient

## 2019-04-17 ENCOUNTER — APPOINTMENT (OUTPATIENT)
Dept: GENERAL RADIOLOGY | Facility: CLINIC | Age: 64
DRG: 165 | End: 2019-04-17
Attending: PHYSICIAN ASSISTANT
Payer: COMMERCIAL

## 2019-04-17 VITALS
TEMPERATURE: 97.5 F | HEART RATE: 63 BPM | RESPIRATION RATE: 16 BRPM | BODY MASS INDEX: 29.98 KG/M2 | DIASTOLIC BLOOD PRESSURE: 68 MMHG | HEIGHT: 69 IN | SYSTOLIC BLOOD PRESSURE: 114 MMHG | WEIGHT: 202.4 LBS | OXYGEN SATURATION: 96 %

## 2019-04-17 LAB — GLUCOSE BLDC GLUCOMTR-MCNC: 106 MG/DL (ref 70–99)

## 2019-04-17 PROCEDURE — 25000128 H RX IP 250 OP 636: Performed by: PHYSICIAN ASSISTANT

## 2019-04-17 PROCEDURE — 71045 X-RAY EXAM CHEST 1 VIEW: CPT

## 2019-04-17 PROCEDURE — 00000146 ZZHCL STATISTIC GLUCOSE BY METER IP

## 2019-04-17 PROCEDURE — 25000132 ZZH RX MED GY IP 250 OP 250 PS 637: Performed by: PHYSICIAN ASSISTANT

## 2019-04-17 RX ORDER — HYDROMORPHONE HYDROCHLORIDE 2 MG/1
2 TABLET ORAL
Qty: 20 TABLET | Refills: 0 | Status: SHIPPED | OUTPATIENT
Start: 2019-04-17

## 2019-04-17 RX ADMIN — ASPIRIN 81 MG: 81 TABLET, COATED ORAL at 08:14

## 2019-04-17 RX ADMIN — CYCLOBENZAPRINE HYDROCHLORIDE 10 MG: 10 TABLET, FILM COATED ORAL at 00:21

## 2019-04-17 RX ADMIN — CARBIDOPA AND LEVODOPA 1 TABLET: 25; 100 TABLET ORAL at 08:14

## 2019-04-17 RX ADMIN — RANITIDINE 150 MG: 150 TABLET ORAL at 08:14

## 2019-04-17 RX ADMIN — SENNOSIDES AND DOCUSATE SODIUM 2 TABLET: 8.6; 5 TABLET ORAL at 08:14

## 2019-04-17 RX ADMIN — KETOROLAC TROMETHAMINE 30 MG: 30 INJECTION, SOLUTION INTRAMUSCULAR; INTRAVENOUS at 04:15

## 2019-04-17 RX ADMIN — KETOROLAC TROMETHAMINE 30 MG: 30 INJECTION, SOLUTION INTRAMUSCULAR; INTRAVENOUS at 10:49

## 2019-04-17 RX ADMIN — ACETAMINOPHEN 975 MG: 325 TABLET, FILM COATED ORAL at 05:53

## 2019-04-17 RX ADMIN — HYDROMORPHONE HYDROCHLORIDE 4 MG: 2 TABLET ORAL at 04:15

## 2019-04-17 RX ADMIN — HYDROMORPHONE HYDROCHLORIDE 4 MG: 2 TABLET ORAL at 00:21

## 2019-04-17 RX ADMIN — CARBIDOPA AND LEVODOPA 1 TABLET: 25; 100 TABLET ORAL at 13:30

## 2019-04-17 RX ADMIN — METRONIDAZOLE: 7.5 GEL TOPICAL at 08:22

## 2019-04-17 RX ADMIN — HYDROMORPHONE HYDROCHLORIDE 4 MG: 2 TABLET ORAL at 08:23

## 2019-04-17 RX ADMIN — ACETAMINOPHEN 975 MG: 325 TABLET, FILM COATED ORAL at 13:30

## 2019-04-17 RX ADMIN — ENOXAPARIN SODIUM 40 MG: 40 INJECTION SUBCUTANEOUS at 08:23

## 2019-04-17 ASSESSMENT — ACTIVITIES OF DAILY LIVING (ADL)
ADLS_ACUITY_SCORE: 12

## 2019-04-17 NOTE — PROGRESS NOTES
THORACIC  SURGERY POD # 1    AVSS on RA  No air leak  No bleeding    CXR looks good with CT clamped    Discussed findings and procedure, path report discussed    D/C  Today    MICHELLE MACDONALD MD [facility] ONCOLOGY THORACIC SURGERY  CELL:  [phone]  OFFICE: [phone]

## 2019-04-17 NOTE — DISCHARGE INSTRUCTIONS
"[facility]  Discharge Orders & Follow-up Care  Video-Assisted Thoracoscopy or Thoracotomy    You already have your follow-up appointments scheduled for you:  Please go to [facility] for a chest x-ray at _____________________________. [facility] is located in the same building ([facility], [address] [place] [address]) as Dr. Wagner' office. They are in [address].  Please then go for your post-operative follow-up appointment in Dr. Wagner' office, on the second floor of the [facility], [address] at ____________. You will see either Lidia Sinclair PA-C or Dr. Wagner during your appointment.      Call Jessica at Dr. Wagner  office at [phone] to make a return appointment with a chest x-ray on Monday 04-22.  Your appointment will be with either Lidia Sinclair PA-C or Dr. Wagner.        A. Patient Care:  Call Dr Wagner  office @ [phone] if you experience:  *Severe chills or a fever or 101 F or higher on two occasions  *Increased incisional pain that cannot be relieved with rest or pain medications  *Presence of unusual incisional or chest tube site drainage that is odorous, green or yellow in color, or if your incision is warm, red or swollen  *Coughing up bright red blood or greenish-yellow secretions  *Chest pain that gets worse with deep breathing or a significant increase in shortness of breath  *Inability to urinate or have a bowel movement  *New pain or swelling in your legs    In an emergency, call [phone] or have someone drive you to the nearest Emergency Department    Pain Relief:  You may have been given a prescription for narcotic pain medicine.  You may also take ibuprofen and acetaminophen either as a new prescription  or over the counter.  Recommended dosages are:  600 mg Ibuprofen every 6 hours as needed and 650 mg Acetaminophen every 4 hours as needed.  Many patients get good pain relief by \"staggering\" these medications. "     Constipation:  Narcotic pain medication, general anesthesia, and time in the hospital with less activity than normal can all cause constipation. Please take a stool softener (what you have at home or one that was prescribed during hospital discharge, such as Senokot-S, docusate sodium, Miralax, Milk of Magnesia) while you are taking narcotics to prevent constipation. Stop taking the stool softener once you are done taking narcotics or if you begin having loose stools/diarrhea. Please call our clinic nurse, Lorrie, at [phone] if you are not having success (not having BMs) with your current stool softener.     No driving while on narcotics.     Activity:  XXX No activity restrictions for a scope procedure/thoracoscopy  ___ No heavy lifting greater than 8-10 pounds on the operative side for 4-6 weeks for a thoracotomy    Wound Care:  *You should look at your incision each day and keep it clean while it heals  *Do not apply any creams, salves such as Bacitracin, or ointments on the incision while it is healing  * Steri strips (thin white paper strips) will be present on the incision(s) and they will peel off as your incision heals-- otherwise, they will be removed at your post-op appointment.    *Remove the dressings covering your chest tube site 48 hours after your discharge from the hospital. You may then shower.  Wash the incision and chest tube site(s) daily with soap and water. No bathing or immersing incision underwater for approximately 2 weeks or until the chest tube sites are completely healed.     Place a dry gauze dressing (and tape) over the chest tube site because it is normal to have some drainage for a few days after the chest tube is removed.  Do not be alarmed if a large amount of fluid drains (should be pink or yellow) either spontaneously or with coughing or exertion. If this happens, just place a larger dry gauze dressing over the chest tube site-- it will stop and scab over in about a week  or so. Once the drainage stops, you can stop covering the chest tube site with a dressing. Call our office if the drainage is milky or green in color or foul-smelling.    B. Respiratory:  ___ Utilize Incentive spirometer and flutter valve/acapella (if you received one) 10 times in a row every few hours while awake for a few weeks after discharging home from the hospital    C. Activity:  It may take a few months to regain your normal energy level/stamina. It is important during your recovery to get regular physical activity:  *walk each day at a comfortable pace  *climb stairs as tolerated  *take some rest periods each day but try not to take too many long naps, as this can affect your sleep at night    D. Returning to Work:  Time away from work will depend on your situation. In general, you will need between 1-6 weeks to recover from surgery. Specific dates for returning to work can be discussed at your post-op appointments.

## 2019-04-17 NOTE — PLAN OF CARE
VSS. A&Ox4. Up Ind. Tele SR. Pain managed with PO dilaudid & schedule tylenol. Chest incisions CDI with steri strips. CT to -20 suction, no air leak no crepitus. Using IS to 1500. LS clear, dim in bases. CT to be clamped at midnight. Voided x1. BS active, passing flatus.

## 2019-04-17 NOTE — PLAN OF CARE
VSS. A/O x 4. Lungs: diminished throughout. Voiding adequately. Up independently. Diet: Regular. Pain managed with Dilaudid 4 mg. Discharge education given, discharge med given x 1 (Dilaudid). Pt refused assistance out, left [address] @ 1445 heading to door 2 where pt's wife will  pt and drive pt home.

## 2019-04-17 NOTE — OP NOTE
Procedure Date: 04/16/2019      SURGEON:  Felix Wagner MD      ASSISTANT:  Lidia Sinclair PA-C      PREOPERATIVE DIAGNOSIS:  Right lower lobe lung nodule.      POSTOPERATIVE DIAGNOSIS:  Granuloma, right lower lobe lung.      PROCEDURE:  Right video-assisted thoracoscopy and wedge resection of right lower lobe lung nodule.      ANESTHESIA:  General with double endotracheal tube.      INDICATIONS:  This 63-year-old gentleman who was found to have a new nodule which is spiculated at the base of the right lower lobe lung.  This nodule was not amenable to CT-guided biopsy.  The PET scan did not show sitting activity.  Options of diagnostic procedure and treatment were discussed and elected to proceed with right video-assisted thoracoscopy with wedge resection for diagnosis and treatment with the plan to proceed with a right lower lobectomy if malignant.      DESCRIPTION OF PROCEDURE:  The patient was brought and placed in a supine position.  Under general anesthesia with double endotracheal tube, the patient was placed in the left lateral decubitus position.  The right chest was prepared and draped in the usual fashion using ChloraPrep.  Ventilation of the right lung was discontinued.  Three thoracoscopic incisions were made.  The lung was examined.  There were some of the filmy adhesions from the diaphragmatic surface of the right lower lobe to the diaphragm.  The nodule was clearly identified.  He had the appearance of a granuloma.  Adhesions were taken down.  Then using electrocautery the inferior pulmonary ligament was divided.  Then, the nodule was resected with 3 applications of Central Pacolet 60 gold stapling device.  The specimen was placed in an Endocatch bag and retrieved through the posterior incision.  A portion of the specimen was sent for stains and cultures.  Frozen section revealed a necrotizing granuloma.  Staple line was hemostatic.  Through a separate stab wound, a 24 [place]itan chest tube was placed  with the tip directed toward the apex posteriorly and sutured with 2-0 silk suture.  Then the 3 thoracoscopic incisions were closed in the usual fashion.  Marcaine 0.5% 30 mL without epinephrine was injected as costal blocks.  Estimated blood loss minimal.  Needle and sponge count were correct.      Lidia Sinclair PA-C was the first assistant during the procedure.  The role as first assistant was essential and necessary in accomplishing the steps of the procedure as described above, providing exposure and retraction as well as handling of the scope.         MICHELLE MACDONALD MD             D: 2019   T: 2019   MT: DAVEY      Name:     ALONZO MORRISON   MRN:      [id number]        Account:        [id number]   :      1955           Procedure Date: 2019      Document: [id number]

## 2019-04-17 NOTE — PLAN OF CARE
A&Ox4. VSS on RA. Up independently in room. Tolerating regular diet. Pain controlled with PRN dilaudid/flexeril. R chest tube site with x3 steri strips, CT clamped at midnight, will return to water seal post AM xray. No crepitus no air leak. Slept between cares.

## 2019-04-18 LAB
ACID FAST STN SPEC QL: NORMAL
ACID FAST STN SPEC QL: NORMAL
COPATH REPORT: NORMAL
SPECIMEN SOURCE: NORMAL

## 2019-05-14 LAB
FUNGUS SPEC CULT: NORMAL
SPECIMEN SOURCE: NORMAL

## 2019-06-13 LAB
MYCOBACTERIUM SPEC CULT: NORMAL
MYCOBACTERIUM SPEC CULT: NORMAL
SPECIMEN SOURCE: NORMAL

## 2024-12-12 ENCOUNTER — TRANSFERRED RECORDS (OUTPATIENT)
Dept: HEALTH INFORMATION MANAGEMENT | Facility: CLINIC | Age: 69
End: 2024-12-12

## 2025-03-12 ENCOUNTER — TRANSCRIBE ORDERS (OUTPATIENT)
Dept: OTHER | Age: 70
End: 2025-03-12

## 2025-03-12 DIAGNOSIS — M25.519 SHOULDER PAIN: ICD-10-CM

## 2025-03-12 DIAGNOSIS — M54.2 NECK PAIN: ICD-10-CM

## 2025-03-12 DIAGNOSIS — M62.81 MUSCLE WEAKNESS (GENERALIZED): Primary | ICD-10-CM

## 2025-03-12 DIAGNOSIS — M48.00 SPINAL STENOSIS: ICD-10-CM

## 2025-03-24 DIAGNOSIS — M54.2 CERVICAL PAIN: Primary | ICD-10-CM

## 2025-04-02 ENCOUNTER — OFFICE VISIT (OUTPATIENT)
Dept: ORTHOPEDICS | Facility: CLINIC | Age: 70
End: 2025-04-02
Attending: NEUROLOGICAL SURGERY
Payer: COMMERCIAL

## 2025-04-02 ENCOUNTER — PRE VISIT (OUTPATIENT)
Dept: ORTHOPEDICS | Facility: CLINIC | Age: 70
End: 2025-04-02

## 2025-04-02 ENCOUNTER — ANCILLARY PROCEDURE (OUTPATIENT)
Dept: GENERAL RADIOLOGY | Facility: CLINIC | Age: 70
End: 2025-04-02
Attending: ORTHOPAEDIC SURGERY
Payer: COMMERCIAL

## 2025-04-02 VITALS — HEIGHT: 70 IN | WEIGHT: 182 LBS | BODY MASS INDEX: 26.05 KG/M2

## 2025-04-02 DIAGNOSIS — R13.10 DYSPHAGIA, UNSPECIFIED TYPE: ICD-10-CM

## 2025-04-02 DIAGNOSIS — M48.02 CERVICAL STENOSIS OF SPINAL CANAL: ICD-10-CM

## 2025-04-02 DIAGNOSIS — M54.12 CERVICAL RADICULOPATHY: ICD-10-CM

## 2025-04-02 DIAGNOSIS — M54.2 NECK PAIN: ICD-10-CM

## 2025-04-02 DIAGNOSIS — G89.29 CHRONIC PAIN OF BOTH SHOULDERS: ICD-10-CM

## 2025-04-02 DIAGNOSIS — M25.511 CHRONIC PAIN OF BOTH SHOULDERS: ICD-10-CM

## 2025-04-02 DIAGNOSIS — M54.2 CERVICAL PAIN: ICD-10-CM

## 2025-04-02 DIAGNOSIS — M25.512 CHRONIC PAIN OF BOTH SHOULDERS: ICD-10-CM

## 2025-04-02 DIAGNOSIS — G20.B2 PARKINSON'S DISEASE WITH DYSKINESIA AND FLUCTUATING MANIFESTATIONS (H): Primary | ICD-10-CM

## 2025-04-02 DIAGNOSIS — M62.81 MUSCLE WEAKNESS (GENERALIZED): ICD-10-CM

## 2025-04-02 PROBLEM — M25.519 SHOULDER PAIN: Status: ACTIVE | Noted: 2025-04-02

## 2025-04-02 PROCEDURE — 72050 X-RAY EXAM NECK SPINE 4/5VWS: CPT | Mod: GC | Performed by: STUDENT IN AN ORGANIZED HEALTH CARE EDUCATION/TRAINING PROGRAM

## 2025-04-02 RX ORDER — TRAZODONE HYDROCHLORIDE 50 MG/1
1 TABLET ORAL
COMMUNITY
Start: 2023-05-18

## 2025-04-02 RX ORDER — EZETIMIBE 10 MG/1
1 TABLET ORAL DAILY
COMMUNITY
Start: 2024-03-15

## 2025-04-02 RX ORDER — LEVODOPA AND CARBIDOPA 195; 48.75 MG/1; MG/1
CAPSULE, EXTENDED RELEASE ORAL
COMMUNITY
Start: 2025-03-17

## 2025-04-02 RX ORDER — GABAPENTIN 300 MG/1
300 CAPSULE ORAL 3 TIMES DAILY
Qty: 90 CAPSULE | Refills: 3 | Status: SHIPPED | OUTPATIENT
Start: 2025-04-02

## 2025-04-02 NOTE — LETTER
4/2/2025      Jonathan Lawson  [address]  [facility] [id number]      Dear Colleague,    Thank you for referring your patient, Jonathan Lawson, to the [facility] [place]. Please see a copy of my visit note below.    Spine Surgery Consultation    REFERRING PHYSICIAN: Moncho Johnson   PRIMARY CARE PHYSICIAN: Jeremy Broussard           Chief Complaint:   Consult (2nd opinion on cervical surgery\Referral Transcribed by external fax  Provider: Beck Johnson MD  affiliated with  Orthopedics clinic at [place].  [place]inson[place])      History of Present Illness:  Symptom Profile Including: location of symptoms, onset, severity, exacerbating/alleviating factors, previous treatments:        Jonathan Lawson is a 69 year old male who presents today for evaluation of his left upper extremity symptoms that is new right upper extremity symptoms.  Of note, the patient has had Parkinson's disease for 6 years and he is on medications for this.  He states that in the past his symptoms have all been left-sided but they have recently migrated to additionally involve the right side.  He states that he is having burning and pain sensation in the lateral aspect of the shoulder and deltoid region for approximately 1 year.  He intermittently gets shooting pain down the arms but this is not a primary concern of his.  Of note, he previously had numbness and tingling in the left upper extremity and underwent a cubital tunnel and carpal tunnel release in September which resulted in improvement in the symptoms.  His primary symptom at this time is a burning sensation over bilateral deltoids.  He has not had any injections in his neck.  He has had an injection in the left shoulder which resulted in some symptom improvement.  He can still play the piano and guitar occasionally.  However, he does have difficulty with fine motor tasks.  He is currently doing physical  therapy which is helping some.  He has additionally tried gabapentin many years ago but does not remember if it helped.  Occasionally uses Xanax and some muscle relaxers as needed.    Additionally, the patient notes that he has difficulty swallowing which he relates to his Parkinson's disease.  He has not had speech therapy or formal swallow study to evaluate this.  He has been following up with Angela Broussard who has been evaluating his left shoulder and neck.  He was referred to us for a second opinion regarding his complex medical situation    Of note, he has a history of a C6-7 ACDF in 2002 after which he has done well with.    Patient denies loss of balance or coordination, saddle anesthesia, loss of bowel or bladder control.          Past Medical History:     Past Medical History:   Diagnosis Date     Arthritis      Chronic sinusitis      Disc disorder of cervical region      Hx of gout      Hyperlipidemia      Lumbago      Lung nodule     RLL     Parkinson's disease (H)             Past Surgical History:     Past Surgical History:   Procedure Laterality Date     BACK SURGERY      Cervical 6-7 fusion     ENT SURGERY      cataract removal bilaterally     ORTHOPEDIC SURGERY      right knee partial replacement     ORTHOPEDIC SURGERY      bilateral knee arthroscopy     ORTHOPEDIC SURGERY      right foot big toe and 2nd joint fused     THORACOSCOPIC WEDGE RESECTION LUNG Right 4/16/2019    Procedure: RIGHT VIDEO ASSISTED THORACOSCOPIC WEDGE RESECTION, RIGHT LOWER LUNG NODULE;  Surgeon: Felix Wagner MD;  Location:  OR            Social History:     Social History     Tobacco Use     Smoking status: Former     Types: Cigarettes     Start date: 4/15/1975     Smokeless tobacco: Current   Substance Use Topics     Alcohol use: Yes     Comment: 2 glasses wine per per day            Family History:   No family history on file.         Allergies:   No Known Allergies         Medications:     Current Outpatient  Medications   Medication Sig Dispense Refill     ALPRAZolam (XANAX) 0.5 MG tablet Take 0.5 mg by mouth 3 times daily as needed for anxiety       Carboxymethylcellulose Sodium (REFRESH TEARS OP) Apply 1 drop to eye every 6 hours as needed       cholecalciferol (VITAMIN D3) 5000 units TABS tablet Take 5,000 Units by mouth daily       Coenzyme Q10 (CO Q 10 PO) Take 200 mg by mouth daily        cyclobenzaprine (FLEXERIL) 10 MG tablet Take 10 mg by mouth 3 times daily as needed for muscle spasms       diclofenac (VOLTAREN) 1 % topical gel Place onto the skin 2 times daily as needed (knee pain)        ezetimibe (ZETIA) 10 MG tablet Take 1 tablet by mouth daily.       metroNIDAZOLE (METROGEL) 0.75 % external gel Apply topically 2 times daily       rosuvastatin (CRESTOR) 40 MG tablet Take 40 mg by mouth At Bedtime       RYTARY 48. MG CPCR per CR capsule 3 caps am 3 caps HS, 4 caps noon 4 caps afternoon       traZODone (DESYREL) 50 MG tablet Take 1 tablet by mouth nightly as needed.       aspirin 81 MG EC tablet Take 81 mg by mouth daily (Patient not taking: Reported on 4/2/2025)       carbidopa-levodopa (SINEMET)  MG tablet Take 1 tablet by mouth 3 times daily (Patient not taking: Reported on 4/2/2025)       fish oil-omega-3 fatty acids 1000 MG capsule Take 1 g by mouth daily (Patient not taking: Reported on 4/2/2025)       HYDROcodone-acetaminophen (NORCO) 5-325 MG tablet Take 1 tablet by mouth every 4 hours as needed for severe pain (Patient not taking: Reported on 4/2/2025)       HYDROmorphone (DILAUDID) 2 MG tablet Take 1 tablet (2 mg) by mouth every 3 hours as needed (Patient not taking: Reported on 4/2/2025) 20 tablet 0     No current facility-administered medications for this visit.             Review of Systems:     A 10 point ROS was performed and reviewed. Specific responses to these questions are noted at the end of the document.         Physical Exam:     PHYSICAL EXAM:   Constitutional - Patient is  "healthy, well-nourished and appears stated age, is in no acute distress    Vitals: Ht 1.78 m (5' 10.08\")   Wt 82.6 kg (182 lb)   BMI 26.06 kg/m     Respiratory - Patient is breathing normally, no audible wheeze or respiratory distress.   Skin - No suspicious rashes or lesions.   Psychiatric - Normal mood. Has somewhat flat affect consistent with Parkinsonism.    Cardiovascular - Extremities warm and well perfused.   GI - No abdominal distention.   Musculoskeletal -Non-antalgic gait without use of assistive devices.              Cervical Spine:    Appearance - normal appearing cervical lordosis.  Resting tremor present but patient reasonably able to control. There is some dyskinesia    GAIT: Tandem gait without assistive device    Palpation - tender to palpation over bilateral deltoid regions    ROM - range of motion is slightly decreased in cervical flexion and extension, lateral rotation, lateral bend. He has positive Neer and Sutton at 160 degrees of shoulder elevation and greater than 90 internal rotation respectively which is not markedly positive as would be expected with intraarticular source of shoulder pain.   No focal weakness with rotator cuff testing.     Motor -        Upper EXTREMITY Left Right   Shoulder Abduction 3/5 5/5   Shoulder Flexion 5/5 5/5   Elbow Flexion 4/5 5/5   Elbow Extension 5/5 5/5   Wrist Extension 4/5 5/5   Wrist Flexion 5/5 5/5   Finger Adbuction 5/5 5/5        Special tests -     Spurling's - Positive to the right     Axial Compression- Negative     Neurologic - Sensation intact to light touch bilaterally.      REFLEXES Left Right   Biceps 2+ 2+   Triceps 2+ 2+   Brachioradialis 2+ 2+   Ruiz's No No   Sustained Clonus No No                Imaging:   We ordered and independently reviewed new radiographs at this clinic visit. The results were discussed with the patient.  Findings include:    X-rays of the cervical spine demonstrate multilevel cervical spondylosis with large " anterior disc osteophyte complex at C3-4 and C4-5.  There is maintained cervical lordosis.  The anterior cervical fusion at C6-7 is well-healed with hardware maintained in appropriate alignment.        MRI of the left shoulder performed on 12/12/2024 demonstrates tendinopathy without evidence of full-thickness tearing.  There is significant cartilage loss and osteophyte formation of the glenoid.   There is a joint effusion present as well.        MRI of the cervical spine performed on 7/10/2024 demonstrates multilevel cervical spondylosis and spinal stenosis from C3-6.  Worst at C3-4 where there is central disc protrusion and degenerative retrolisthesis with severe spinal stenosis and cord shape change.  At C4-5 there is mild to moderate left neuroforaminal stenosis.  At C5-6 there is moderate right-sided canal stenosis.                       Assessment and Plan:   Assessment:  69 year old male with multilevel cervical spondylosis, spinal stenosis, and adjacent level degeneration from C3-6.  Presenting with symptoms of cervical radiculopathy along with some difficulty with fine motor tasks which may be consistent with cervical myelopathy versus his underlying Parkinson's disease     Plan:  Reviewed the diagnosis and imaging with the patient in depth.  We discussed that this may be multifactorial in nature given his MRI findings of his left shoulder, his underlying Parkinson's disease, and his MRI findings of his cervical spine.  We discussed that his swallowing difficulty may be related to his Parkinson's disease and/or his anterior osteophytes from C3-5.  We recommend that he obtain a swallow study to further evaluate for mechanical compression.  If the swallow study demonstrates this, removal of the anterior osteophytes along with decompression with a multilevel ACDF would likely improve both his arm symptoms and some of his swallowing difficulty.  However, we would like to avoid worsening his swallowing function  if it is fully related to his Parkinson's disease and the anterior osteophytes are not causing compression.  We will also send him for a steroid injection at the adjacent level to his fusion.  We will use this injection for both diagnostic and hopefully therapeutic purposes.  If the injection resulted in significant pain improvement, then it is likely that his symptoms are coming from the cervical spine.  We will also place an order for gabapentin which may result in symptom improvement.  We will have him follow-up after he has had the injection for discussion of further surgery.  The surgery would either consist of a 3 level ACDF versus posterior based decompression.  I would also like to get a video swallow study as well which will help determine if his dysphagia is due to large ventral osteophytes at C3-4 and C4-5.  If this is the case ACDF may help improve his dysphagia as these osteophytes would be resected during surgery.  Patient understands and agrees with this plan.  He was encouraged to follow-up sooner if he develops worsening balance, dexterity, bowel or bladder issues, etc. which may be concerning for myelopathy.    -Obtain swallow study to evaluate for mechanical compression from anterior osteophytes at C3-4 and C4-5  -Cervical spine steroid injection order placed  -Prescription for gabapentin given  -Follow-up after injection and swallow study for discussion of further operative intervention to consist of 3 level ACDF versus posterior based decompression    Alexei Haley, DO  Spine Fellow    STAFF ADDENDUM:    I reviewed the patient's history, physical examination and imaging studies with the Surgical Fellow.  In addition I individually examined the patient and developed the treatment plan. I personally performed greater the substantive portion of the encounter today including developing the treatment plan and counseling the patient regarding treatment options. I have reviewed and edited the clinical  documentation as appropriate and agree with the assessment and plan as documented.     Time spent on this clinical encounter by me, independent of the Surgical Fellow, was 45 minutes. This included chart review, history and physical examination, patient counseling, care coordination and documentation.     Jermain Haro MD  Orthopedic Spine Surgeon  , Department of Orthopedic Surgery             Again, thank you for allowing me to participate in the care of your patient.        Sincerely,        Jermain Haro MD    Electronically signed

## 2025-04-02 NOTE — PROGRESS NOTES
Spine Surgery Consultation    REFERRING PHYSICIAN: Moncho Johnson   PRIMARY CARE PHYSICIAN: Jeremy Broussard           Chief Complaint:   Consult (2nd opinion on cervical surgery\Referral Transcribed by external fax  Provider: Beck Johnson MD  affiliated with [facility] [place].  [place]inson[place])      History of Present Illness:  Symptom Profile Including: location of symptoms, onset, severity, exacerbating/alleviating factors, previous treatments:        Jonathan Lawson is a 69 year old male who presents today for evaluation of his left upper extremity symptoms that is new right upper extremity symptoms.  Of note, the patient has had Parkinson's disease for 6 years and he is on medications for this.  He states that in the past his symptoms have all been left-sided but they have recently migrated to additionally involve the right side.  He states that he is having burning and pain sensation in the lateral aspect of the shoulder and deltoid region for approximately 1 year.  He intermittently gets shooting pain down the arms but this is not a primary concern of his.  Of note, he previously had numbness and tingling in the left upper extremity and underwent a cubital tunnel and carpal tunnel release in September which resulted in improvement in the symptoms.  His primary symptom at this time is a burning sensation over bilateral deltoids.  He has not had any injections in his neck.  He has had an injection in the left shoulder which resulted in some symptom improvement.  He can still play the piano and guitar occasionally.  However, he does have difficulty with fine motor tasks.  He is currently doing physical therapy which is helping some.  He has additionally tried gabapentin many years ago but does not remember if it helped.  Occasionally uses Xanax and some muscle relaxers as needed.    Additionally, the patient notes that he has difficulty swallowing which he relates to  his Parkinson's disease.  He has not had speech therapy or formal swallow study to evaluate this.  He has been following up with Angela Broussard who has been evaluating his left shoulder and neck.  He was referred to us for a second opinion regarding his complex medical situation    Of note, he has a history of a C6-7 ACDF in 2002 after which he has done well with.    Patient denies loss of balance or coordination, saddle anesthesia, loss of bowel or bladder control.          Past Medical History:     Past Medical History:   Diagnosis Date    Arthritis     Chronic sinusitis     Disc disorder of cervical region     Hx of gout     Hyperlipidemia     Lumbago     Lung nodule     RLL    Parkinson's disease (H)             Past Surgical History:     Past Surgical History:   Procedure Laterality Date    BACK SURGERY      Cervical 6-7 fusion    ENT SURGERY      cataract removal bilaterally    ORTHOPEDIC SURGERY      right knee partial replacement    ORTHOPEDIC SURGERY      bilateral knee arthroscopy    ORTHOPEDIC SURGERY      right foot big toe and 2nd joint fused    THORACOSCOPIC WEDGE RESECTION LUNG Right 4/16/2019    Procedure: RIGHT VIDEO ASSISTED THORACOSCOPIC WEDGE RESECTION, RIGHT LOWER LUNG NODULE;  Surgeon: Felix Wagner MD;  Location:  OR            Social History:     Social History     Tobacco Use    Smoking status: Former     Types: Cigarettes     Start date: 4/15/1975    Smokeless tobacco: Current   Substance Use Topics    Alcohol use: Yes     Comment: 2 glasses wine per per day            Family History:   No family history on file.         Allergies:   No Known Allergies         Medications:     Current Outpatient Medications   Medication Sig Dispense Refill    ALPRAZolam (XANAX) 0.5 MG tablet Take 0.5 mg by mouth 3 times daily as needed for anxiety      Carboxymethylcellulose Sodium (REFRESH TEARS OP) Apply 1 drop to eye every 6 hours as needed      cholecalciferol (VITAMIN D3) 5000 units TABS  "tablet Take 5,000 Units by mouth daily      Coenzyme Q10 (CO Q 10 PO) Take 200 mg by mouth daily       cyclobenzaprine (FLEXERIL) 10 MG tablet Take 10 mg by mouth 3 times daily as needed for muscle spasms      diclofenac (VOLTAREN) 1 % topical gel Place onto the skin 2 times daily as needed (knee pain)       ezetimibe (ZETIA) 10 MG tablet Take 1 tablet by mouth daily.      metroNIDAZOLE (METROGEL) 0.75 % external gel Apply topically 2 times daily      rosuvastatin (CRESTOR) 40 MG tablet Take 40 mg by mouth At Bedtime      RYTARY 48. MG CPCR per CR capsule 3 caps am 3 caps HS, 4 caps noon 4 caps afternoon      traZODone (DESYREL) 50 MG tablet Take 1 tablet by mouth nightly as needed.      aspirin 81 MG EC tablet Take 81 mg by mouth daily (Patient not taking: Reported on 4/2/2025)      carbidopa-levodopa (SINEMET)  MG tablet Take 1 tablet by mouth 3 times daily (Patient not taking: Reported on 4/2/2025)      fish oil-omega-3 fatty acids 1000 MG capsule Take 1 g by mouth daily (Patient not taking: Reported on 4/2/2025)      HYDROcodone-acetaminophen (NORCO) 5-325 MG tablet Take 1 tablet by mouth every 4 hours as needed for severe pain (Patient not taking: Reported on 4/2/2025)      HYDROmorphone (DILAUDID) 2 MG tablet Take 1 tablet (2 mg) by mouth every 3 hours as needed (Patient not taking: Reported on 4/2/2025) 20 tablet 0     No current facility-administered medications for this visit.             Review of Systems:     A 10 point ROS was performed and reviewed. Specific responses to these questions are noted at the end of the document.         Physical Exam:     PHYSICAL EXAM:   Constitutional - Patient is healthy, well-nourished and appears stated age, is in no acute distress    Vitals: Ht 1.78 m (5' 10.08\")   Wt 82.6 kg (182 lb)   BMI 26.06 kg/m     Respiratory - Patient is breathing normally, no audible wheeze or respiratory distress.   Skin - No suspicious rashes or lesions.   Psychiatric - Normal " mood. Has somewhat flat affect consistent with Parkinsonism.    Cardiovascular - Extremities warm and well perfused.   GI - No abdominal distention.   Musculoskeletal -Non-antalgic gait without use of assistive devices.              Cervical Spine:    Appearance - normal appearing cervical lordosis.  Resting tremor present but patient reasonably able to control. There is some dyskinesia    GAIT: Tandem gait without assistive device    Palpation - tender to palpation over bilateral deltoid regions    ROM - range of motion is slightly decreased in cervical flexion and extension, lateral rotation, lateral bend. He has positive Neer and Sutton at 160 degrees of shoulder elevation and greater than 90 internal rotation respectively which is not markedly positive as would be expected with intraarticular source of shoulder pain.   No focal weakness with rotator cuff testing.     Motor -        Upper EXTREMITY Left Right   Shoulder Abduction 3/5 5/5   Shoulder Flexion 5/5 5/5   Elbow Flexion 4/5 5/5   Elbow Extension 5/5 5/5   Wrist Extension 4/5 5/5   Wrist Flexion 5/5 5/5   Finger Adbuction 5/5 5/5        Special tests -     Spurling's - Positive to the right     Axial Compression- Negative     Neurologic - Sensation intact to light touch bilaterally.      REFLEXES Left Right   Biceps 2+ 2+   Triceps 2+ 2+   Brachioradialis 2+ 2+   Ruiz's No No   Sustained Clonus No No                Imaging:   We ordered and independently reviewed new radiographs at this clinic visit. The results were discussed with the patient.  Findings include:    X-rays of the cervical spine demonstrate multilevel cervical spondylosis with large anterior disc osteophyte complex at C3-4 and C4-5.  There is maintained cervical lordosis.  The anterior cervical fusion at C6-7 is well-healed with hardware maintained in appropriate alignment.        MRI of the left shoulder performed on 12/12/2024 demonstrates tendinopathy without evidence of  full-thickness tearing.  There is significant cartilage loss and osteophyte formation of the glenoid.   There is a joint effusion present as well.        MRI of the cervical spine performed on 7/10/2024 demonstrates multilevel cervical spondylosis and spinal stenosis from C3-6.  Worst at C3-4 where there is central disc protrusion and degenerative retrolisthesis with severe spinal stenosis and cord shape change.  At C4-5 there is mild to moderate left neuroforaminal stenosis.  At C5-6 there is moderate right-sided canal stenosis.                       Assessment and Plan:   Assessment:  69 year old male with multilevel cervical spondylosis, spinal stenosis, and adjacent level degeneration from C3-6.  Presenting with symptoms of cervical radiculopathy along with some difficulty with fine motor tasks which may be consistent with cervical myelopathy versus his underlying Parkinson's disease     Plan:  Reviewed the diagnosis and imaging with the patient in depth.  We discussed that this may be multifactorial in nature given his MRI findings of his left shoulder, his underlying Parkinson's disease, and his MRI findings of his cervical spine.  We discussed that his swallowing difficulty may be related to his Parkinson's disease and/or his anterior osteophytes from C3-5.  We recommend that he obtain a swallow study to further evaluate for mechanical compression.  If the swallow study demonstrates this, removal of the anterior osteophytes along with decompression with a multilevel ACDF would likely improve both his arm symptoms and some of his swallowing difficulty.  However, we would like to avoid worsening his swallowing function if it is fully related to his Parkinson's disease and the anterior osteophytes are not causing compression.  We will also send him for a steroid injection at the adjacent level to his fusion.  We will use this injection for both diagnostic and hopefully therapeutic purposes.  If the injection  resulted in significant pain improvement, then it is likely that his symptoms are coming from the cervical spine.  We will also place an order for gabapentin which may result in symptom improvement.  We will have him follow-up after he has had the injection for discussion of further surgery.  The surgery would either consist of a 3 level ACDF versus posterior based decompression.  I would also like to get a video swallow study as well which will help determine if his dysphagia is due to large ventral osteophytes at C3-4 and C4-5.  If this is the case ACDF may help improve his dysphagia as these osteophytes would be resected during surgery.  Patient understands and agrees with this plan.  He was encouraged to follow-up sooner if he develops worsening balance, dexterity, bowel or bladder issues, etc. which may be concerning for myelopathy.    -Obtain swallow study to evaluate for mechanical compression from anterior osteophytes at C3-4 and C4-5  -Cervical spine steroid injection order placed  -Prescription for gabapentin given  -Follow-up after injection and swallow study for discussion of further operative intervention to consist of 3 level ACDF versus posterior based decompression    Alexei Prior, DO  Spine Fellow    STAFF ADDENDUM:    I reviewed the patient's history, physical examination and imaging studies with the Surgical Fellow.  In addition I individually examined the patient and developed the treatment plan. I personally performed greater the substantive portion of the encounter today including developing the treatment plan and counseling the patient regarding treatment options. I have reviewed and edited the clinical documentation as appropriate and agree with the assessment and plan as documented.     Time spent on this clinical encounter by me, independent of the Surgical Fellow, was 45 minutes. This included chart review, history and physical examination, patient counseling, care coordination and  documentation.     Jermain Haro MD  Orthopedic Spine Surgeon  , Department of Orthopedic Surgery

## 2025-04-02 NOTE — NURSING NOTE
"Reason For Visit:   Chief Complaint   Patient presents with    Consult     2nd opinion on cervical surgery\Referral Transcribed by external fax  Provider: Beck Johnson MD  affiliated with [facility] at [place].  [facility]-  has parkinson's       Primary MD: Jeremy Broussard  Ref. MD: Dr. Johnson    ?  No  Occupation Retired.      Date of injury: No  Type of injury: Parkinson's.    Date of surgery: 2002  Type of surgery: Cervical 6-7 fusion.    Smoker: No  Request smoking cessation information: No    Ht 1.78 m (5' 10.08\")   Wt 82.6 kg (182 lb)   BMI 26.06 kg/m      Pain Assessment  Patient Currently in Pain: Yes  0-10 Pain Scale: 2  Primary Pain Location: Neck    Oswestry (ANNA) Questionnaire         No data to display                     Neck Disability Index (NDI) Questionnaire         No data to display                       Visual Analog Pain Scale  Back Pain Scale 0-10: 0  Right leg pain: 0  Left leg pain: 0  Neck Pain Scale 0-10: 2  Right arm pain: 0  Left arm pain: 0 (burning and tingling and pain comes and goes)    Promis 10 Assessment         No data to display                         Janina Alejandroo, LPN   "

## 2025-04-03 PROBLEM — M54.2 NECK PAIN: Status: ACTIVE | Noted: 2025-04-03

## 2025-04-03 PROBLEM — M48.00 SPINAL STENOSIS: Status: ACTIVE | Noted: 2025-04-02

## 2025-04-04 ENCOUNTER — PREP FOR PROCEDURE (OUTPATIENT)
Dept: OTHER | Facility: CLINIC | Age: 70
End: 2025-04-04
Payer: COMMERCIAL

## 2025-04-04 DIAGNOSIS — M54.12 CERVICAL RADICULOPATHY: Primary | ICD-10-CM

## 2025-04-07 ENCOUNTER — TELEPHONE (OUTPATIENT)
Dept: PALLIATIVE MEDICINE | Facility: OTHER | Age: 70
End: 2025-04-07
Payer: COMMERCIAL

## 2025-04-07 NOTE — TELEPHONE ENCOUNTER
Called patient to schedule procedure with Dr. Cr    Date of Procedure: 5/2/25    Arrival time given: Yes: Arrival Time 7:30am       Procedure Location: [facility], [place]     Verified Location with Patient:  Yes  Address provided to the patient    Pre-op H&P Required:  No: Local anesthesia        Post-Op/Follow Up Appt:  Not Indicated in Request      Informed patient they will need a  to drive them home:  Yes    Patients : Spouse     Patient is aware that pre-op RN from the procedure center will call 2-3 days prior to scheduled procedure to confirm arrival time and review any instructions:  Yes       Additional Comments: N/A        Shoshana Rascon MA on 4/7/2025 at 4:27 PM      P: [phone]

## 2025-04-07 NOTE — TELEPHONE ENCOUNTER
FUTURE VISIT INFORMATION      FUTURE VISIT INFORMATION:  Date: 7/28/25  Time: 3:30PM  Location: [facility]  REFERRAL INFORMATION:  Referring provider:  Jermain Haro MD   Referring providers clinic:  [facility]  Reason for visit/diagnosis      RECORDS REQUESTED FROM:       Clinic name Comments Records Status Imaging Status   [facility] 4/2/25- Ov Jermain Vivar MD  Epic

## 2025-04-08 NOTE — TELEPHONE ENCOUNTER
RN reviewed pre-procedure instructions with patient via Zo[employer]. Writer also attempted call to patient and left a voicemail. Writer informed Aspirin requires a 6 day hold prior to the procedure and advised to contact the clinic if patient is taking any other blood thinners or has questions.    Alanna Osorio RN

## 2025-05-02 ENCOUNTER — HOSPITAL ENCOUNTER (OUTPATIENT)
Facility: AMBULATORY SURGERY CENTER | Age: 70
Discharge: HOME | End: 2025-05-02
Attending: STUDENT IN AN ORGANIZED HEALTH CARE EDUCATION/TRAINING PROGRAM | Admitting: STUDENT IN AN ORGANIZED HEALTH CARE EDUCATION/TRAINING PROGRAM
Payer: COMMERCIAL

## 2025-05-02 ENCOUNTER — ANCILLARY PROCEDURE (OUTPATIENT)
Dept: RADIOLOGY | Facility: AMBULATORY SURGERY CENTER | Age: 70
End: 2025-05-02
Attending: STUDENT IN AN ORGANIZED HEALTH CARE EDUCATION/TRAINING PROGRAM
Payer: COMMERCIAL

## 2025-05-02 VITALS
RESPIRATION RATE: 16 BRPM | SYSTOLIC BLOOD PRESSURE: 133 MMHG | DIASTOLIC BLOOD PRESSURE: 79 MMHG | HEART RATE: 64 BPM | HEIGHT: 70 IN | TEMPERATURE: 97.4 F | BODY MASS INDEX: 26.05 KG/M2 | WEIGHT: 182 LBS | OXYGEN SATURATION: 93 %

## 2025-05-02 DIAGNOSIS — M54.12 CERVICAL RADICULOPATHY: ICD-10-CM

## 2025-05-02 PROCEDURE — 62321 NJX INTERLAMINAR CRV/THRC: CPT | Performed by: STUDENT IN AN ORGANIZED HEALTH CARE EDUCATION/TRAINING PROGRAM

## 2025-05-02 PROCEDURE — 62321 NJX INTERLAMINAR CRV/THRC: CPT

## 2025-05-02 RX ORDER — SILDENAFIL CITRATE 20 MG/1
20 TABLET ORAL PRN
COMMUNITY

## 2025-05-02 RX ORDER — DEXAMETHASONE SODIUM PHOSPHATE 10 MG/ML
INJECTION, SOLUTION INTRA-ARTICULAR; INTRALESIONAL; INTRAMUSCULAR; INTRAVENOUS; SOFT TISSUE PRN
Status: DISCONTINUED | OUTPATIENT
Start: 2025-05-02 | End: 2025-05-02 | Stop reason: HOSPADM

## 2025-05-02 RX ORDER — IOPAMIDOL 408 MG/ML
INJECTION, SOLUTION INTRATHECAL PRN
Status: DISCONTINUED | OUTPATIENT
Start: 2025-05-02 | End: 2025-05-02 | Stop reason: HOSPADM

## 2025-05-02 RX ORDER — LIDOCAINE HYDROCHLORIDE 10 MG/ML
INJECTION, SOLUTION EPIDURAL; INFILTRATION; INTRACAUDAL; PERINEURAL PRN
Status: DISCONTINUED | OUTPATIENT
Start: 2025-05-02 | End: 2025-05-02 | Stop reason: HOSPADM

## 2025-05-02 NOTE — DISCHARGE INSTRUCTIONS
Home Care Instructions after an Epidural Steroid Pain Injection    A lumbar epidural steroid injection delivers steroid medication directly into the area that may be causing your lower back pain and/or leg pain. A cervical or thoracic epidural steroid injection delivers steroids into the epidural space surrounding spinal nerve roots to help relieve pain in the upper spine/neck.    Activity  -Rest today  -Do not work today  -Resume normal activity tomorrow  -DO NOT shower for 24 hours  -DO NOT remove bandaid for 24 hours    Pain  -You may experience soreness at the injection site for one or two days  -You may use an ice pack for 20 minutes every 2 hours for the first 24 hours  -You may use a heating pad after the first 24 hours  -You may use Tylenol (acetaminophen) every 4 hours or other pain medicines as     directed by your physician    You may experience numbness radiating into your legs or arms (depending on the procedure location). This numbness may last several hours. Until sensation returns to normal; please use caution in walking, climbing stairs, and stepping out of your vehicle, etc.      Common side effects of steroids:  Not everyone will experience corticosteroid side effects. If side effects are experienced, they will gradually subside in the 7-10 day period following an injection. Most common side effects include:  -Flushed face and/or chest  -Feeling of warmth, particularly in the face but could be an overall feeling of warmth  -Increased blood sugar in diabetic patients  -Menstrual irregularities my occur. If taking hormone-based birth control an alternate method of birth control is recommended  -Sleep disturbances and/or mood swings are possible  -Leg cramps    Please contact us if you have:  -Severe pain  -Fever more than 101.5 degrees Fahrenheit  -Signs of infection at the injection site (redness, swelling, or drainage)    FOR PAIN CENTER PATIENTS:  If you have questions, please contact the Pain  Clinic at [phone] Option #1 between the hours of 7:00 am and 3:00 pm Monday through Friday. After office hours you can contact the on call provider by dialing [phone]. If you need immediate attention, we recommend that you go to a hospital emergency room or dial [phone].

## 2025-05-02 NOTE — OP NOTE
Pre procedure Diagnosis: Cervical Radiculopathy   Post procedure Diagnosis: Same  Procedure performed: C7-T1 interlaminar epidural steroid injection, CPT code 99894  Anesthesia: none  Complications: none immediately noted  Operators: Kenneth Cr MD    Indications:   Jonathan Lawson is a 69 year old male was sent by Dr. Haro for a cervical epidural steroid injection. During the consent process, this procedure was determined to be an appropriate intervention for the patient's symptoms.    Options/alternatives, benefits and risks were discussed with the patient including bleeding, infection, no pain relief, tissue trauma, exposure to radiation, reaction to medications, spinal cord injury, dural puncture, weakness, numbness and headache.  Questions were answered to his satisfaction and he agrees to proceed. Voluntary informed consent was obtained and signed.     Vitals were reviewed: Yes  Allergies were reviewed:  Yes   Medications were reviewed:  Yes   Pre-procedure pain score: 8/10    Procedure:  After getting informed consent, patient was brought into the procedure suite and was placed in a prone position on the procedure table.   A procedural pause was performed.  Patient was prepped and draped in sterile fashion.     The C7-T1 interspace was identified with AP fluoroscopy.  A total of 3ml of 1% lidocaine was used to anesthetize the skin for a left paramedian approach.    A 22gauge 3.5inch Touhy needle was advanced under intermittent fluoroscopy.  A JANE syringe was used to advance the needle into the epidural space.   After loss of resistance, there was no evidence of blood or CSF on aspiration. Location was verified with both AP and either contralateral oblique or lateral fluoroscopic imaging.    A total of 1ml of Isovue 200 was injected demonstrating appropriate epidural spread and was checked in both the AP and lateral views. There was no evidence of intravascular or intrathecal spread.    A  mixture of 10mg of dexamethasone and 2ml of preservative free saline was injected.  The needle was removed from the epidural space and fully removed from the skin.     Hemostasis was achieved, the area was cleaned, and bandaids were placed when appropriate.  The patient tolerated the procedure well, and was taken to the recovery room.    Images were saved to PACS.    Post-procedure pain score: 2/10  Follow-up includes:   -f/u with referring provider    Kenneth rC MD  Interventional Pain Medicine  [facility]

## 2025-05-02 NOTE — H&P
Jonathan Lawson  [id number]  male  69 year old      Reason for procedure/surgery: cervical radiculopathy    Patient Active Problem List   Diagnosis    Nodule of lower lobe of right lung    Shoulder pain    Muscle weakness (generalized)    Dysphagia, unspecified type    Cervical radiculopathy    Spinal stenosis    Parkinson's disease with dyskinesia and fluctuating manifestations (H)    Neck pain       Past Surgical History:    Past Surgical History:   Procedure Laterality Date    BACK SURGERY      Cervical 6-7 fusion    ENT SURGERY      cataract removal bilaterally    ORTHOPEDIC SURGERY      right knee partial replacement    ORTHOPEDIC SURGERY      bilateral knee arthroscopy    ORTHOPEDIC SURGERY      right foot big toe and 2nd joint fused    THORACOSCOPIC WEDGE RESECTION LUNG Right 4/16/2019    Procedure: RIGHT VIDEO ASSISTED THORACOSCOPIC WEDGE RESECTION, RIGHT LOWER LUNG NODULE;  Surgeon: Felix Wagner MD;  Location: [facility] OR       Past Medical History:   Past Medical History:   Diagnosis Date    Arthritis     Chronic sinusitis     Disc disorder of cervical region     Hx of gout     Hyperlipidemia     Lumbago     Lung nodule     RLL    Parkinson's disease (H)        Social History:   Social History     Tobacco Use    Smoking status: Former     Types: Cigarettes     Start date: 4/15/1975    Smokeless tobacco: Current   Substance Use Topics    Alcohol use: Yes     Comment: 2 glasses wine per per day       Family History: History reviewed. No pertinent family history.    Allergies: No Known Allergies    Active Medications:   Current Outpatient Medications   Medication Sig Dispense Refill    ALPRAZolam (XANAX) 0.5 MG tablet Take 0.5 mg by mouth 3 times daily as needed for anxiety      carbidopa-levodopa (SINEMET)  MG tablet Take 1 tablet by mouth 3 times daily.      Coenzyme Q10 (CO Q 10 PO) Take 200 mg by mouth daily       cyclobenzaprine (FLEXERIL) 10 MG tablet Take 10 mg by mouth 3 times daily  "as needed for muscle spasms      ezetimibe (ZETIA) 10 MG tablet Take 1 tablet by mouth daily.      gabapentin (NEURONTIN) 300 MG capsule Take 1 capsule (300 mg) by mouth 3 times daily. 90 capsule 3    rosuvastatin (CRESTOR) 40 MG tablet Take 40 mg by mouth At Bedtime      RYTARY 48. MG CPCR per CR capsule 3 caps am 3 caps HS, 4 caps noon 4 caps afternoon      traZODone (DESYREL) 50 MG tablet Take 1 tablet by mouth nightly as needed.      Carboxymethylcellulose Sodium (REFRESH TEARS OP) Apply 1 drop to eye every 6 hours as needed      cholecalciferol (VITAMIN D3) 5000 units TABS tablet Take 5,000 Units by mouth daily      diclofenac (VOLTAREN) 1 % topical gel Place onto the skin 2 times daily as needed (knee pain)       metroNIDAZOLE (METROGEL) 0.75 % external gel Apply topically 2 times daily      sildenafil (REVATIO) 20 MG tablet Take 20 mg by mouth as needed.         Systemic Review:   CONSTITUTIONAL: NEGATIVE for fever, chills, change in weight  ENT/MOUTH: NEGATIVE for ear, mouth and throat problems  RESP: NEGATIVE for significant cough or SOB  CV: NEGATIVE for chest pain, palpitations or peripheral edema    Physical Examination:   Vital Signs: BP (!) 145/87   Pulse 79   Temp 97.4  F (36.3  C) (Temporal)   Resp 16   Ht 1.78 m (5' 10.08\")   Wt 82.6 kg (182 lb)   SpO2 93%   BMI 26.05 kg/m    GENERAL: healthy appearing, alert and no distress  NECK: no adenopathy, no asymmetry, masses, or scars  RESP: normal work of breathing on room air  CV: warm, well perfused extremities, normal capillary refill  ABDOMEN: nondistended  MS: no gross musculoskeletal defects noted, no edema    Plan: Appropriate to proceed as scheduled.    Kenneth Cr MD  Interventional Pain Medicine  [facility]    5/2/2025          "

## 2025-05-21 NOTE — PROGRESS NOTES
SPEECH LANGUAGE PATHOLOGY EVALUATION       Fall Risk Screen:  Have you fallen 2 or more times in the past year?: No  Have you fallen and had an injury in the past year?: No  Is patient receiving Physical Therapy Services?: No    Subjective        Presenting condition or subjective complaint:  Pt presents today for video swallow study evaluation.  Date of onset:      Relevant medical history:     Past Medical History:   Diagnosis Date    Arthritis     Chronic sinusitis     Disc disorder of cervical region     Hx of gout     Hyperlipidemia     Lumbago     Lung nodule     RLL    Parkinson's disease (H)      Dates & types of surgery:    Past Surgical History:   Procedure Laterality Date    BACK SURGERY      Cervical 6-7 fusion    ENT SURGERY      cataract removal bilaterally    INJECT EPIDURAL CERVICAL N/A 5/2/2025    Procedure: INJECTION, SPINE, CERVICAL, EPIDURAL;  Surgeon: Kenneth Cr MD;  Location: [facility] OR    ORTHOPEDIC SURGERY      right knee partial replacement    ORTHOPEDIC SURGERY      bilateral knee arthroscopy    ORTHOPEDIC SURGERY      right foot big toe and 2nd joint fused    THORACOSCOPIC WEDGE RESECTION LUNG Right 4/16/2019    Procedure: RIGHT VIDEO ASSISTED THORACOSCOPIC WEDGE RESECTION, RIGHT LOWER LUNG NODULE;  Surgeon: Felix Wagner MD;  Location:  OR       Prior diagnostic imaging/testing results:  no     Prior therapy history for the same diagnosis, illness or injury:  no      Hobbies/Interests:  music    Patient goals for therapy:  to assess his swallow    Pain assessment: Pain denied     Objective     SWALLOW EVALUTION  Dysphagia history: Pt reports taking regular textures and thin liquids. He notes some difficulty triggering his swallow and needing to be very mindful when eating/drinking. He denies coughing/throat clearing and feeling of pharyngeal stasis with PO. Endorses difficulty with pills. Reports pills get stuck at his collarbone and then he has to eat a puree texture to  "\"push it down\". Reports warm water often helps, too.     Current Diet/Method of Nutritional Intake: thin liquids (level 0), regular diet      CLINICAL SWALLOW EVALUATION  Oral Motor Function:   Dentition: adequate dentition  Secretion management: WFL  Mucosal quality: adequate  Mandibular function: intact  Oral labial function: WFL  Lingual function: slower, but functional  Velar function: intact   Buccal function: intact  Laryngeal function: cough, throat clear, volitional swallow, voicing WFL     Level of assist required for feeding: no assistance needed   Textures Trialed:   Clinical Swallow Eval: Thin Liquids  Mode of presentation: cup, self-fed   Volume presented: 3oz  Preparatory Phase: WFL  Oral Phase: WFL  Pharyngeal phase of swallow: repeated swallows   Strategies trialed during procedure: n/a   Diagnostic statement: No clinical s/sx of penetration/aspiration. Repeated swallows noted. Pt reports it feels like the pill he took before he came here is stuck at his collarbone.     ADDITIONAL EVAL COMPLETED TODAY : yes; rationale: to further assess pharyngeal phase    VIDEOFLUOROSCOPIC SWALLOW STUDY  Radiologist: Resident  Views Taken: left lateral, A/P   Physical location of procedure: [facility]  Patient sitting upright on chair/stool     VFSS textures trialed:   VFSS Eval: Thin Liquids  Mode of Presentation: cup, straw, self-fed   Order of Presentation: Series 1, 2, 6, 9, 14AP  Preparatory Phase: WFL  Oral Phase: premature pharyngeal entry  Bolus Location When Swallow Initiated: valleculae, pyriforms  Pharyngeal Phase: WFL  Rosenbeck's Penetration Aspiration Scale: 2 - contrast enters airway, remains above the vocal cords, no residue remains (penetration)  Strategies and Compensations: not applicable  Diagnostic Statement: intermittent, minimal premature spillage. Penetration only observed with large, serial sips. No aspiration. Intermittent minimal oral residuals. AP reveals symmetric bolus flow through " oropharynx.    VFSS Eval: Mildly Thick Liquids  Mode of Presentation: cup, self-fed   Order of Presentation: Series 3  Preparatory Phase: WFL  Oral Phase: trace residuals along BOT  Bolus Location When Swallow Initiated: posterior angle of ramus  Pharyngeal Phase: WFL  Rosenbeck's Penetration Aspiration Scale: 1 - no aspiration, contrast does not enter airway  Strategies and Compensations: not applicable  Diagnostic Statement: No penetration/aspiration. Trace residuals along BOT.    VFSS Eval: Purees  Mode of Presentation: spoon, self-fed   Order of Presentation: Series 4, 5, 13AP  Preparatory Phase: WFL  Oral Phase: delay to valleculae  Bolus Location When Swallow Initiated: valleculae  Pharyngeal Phase: WFL  Rosenbeck s Penetration Aspiration Scale: 1 - no aspiration, contrast does not enter airway  Strategies and Compensations: not applicable  Diagnostic Statement: Delay to valleculae. No penetration/aspiration or significant residuals. AP reveals right bolus preference.     VFSS Eval: Solids  Mode of Presentation: self-fed   Order of Presentation: Series 7, 8  Preparatory Phase: WFL  Oral Phase: premature pharyngeal entry  Bolus Location When Swallow Initiated: valleculae  Pharyngeal Phase: WFL   Rosenbeck s Penetration Aspiration Scale: 1 - no aspiration, contrast does not enter airway  Strategies and Compensations: not applicable  Diagnostic Statement: Premature spillage to valleculae. No penetration/aspiration or significant residuals.    VFSS Eval: Barium Tablet  Mode of Presentation: whole tablet taken with thin water by cup   Order of Presentation: 10 (AP11-12)  Preparatory Phase: WFL  Oral Phase: WFL  Bolus Location When Swallow Initiated: posterior angle of ramus  Pharyngeal Phase: WFL  Rosenbeck s Penetration Aspiration Scale: 1 - no aspiration, contrast does not enter airway  Strategies and Compensations: not applicable  Diagnostic Statement: Barium tablet passed through oropharynx without  difficulty.However, pt felt as if the tablet was stuck near his collarbone. AP and esophageal sweep demonstrated the pill stuck in the distal esophagus.      ESOPHAGEAL PHASE OF SWALLOW  patient reports symptoms of esophageal dysphagia  patient presents with symptoms of esophageal dysphagia  esophageal sweep performed during today's videofluoroscopic exam  please refer to radiologist's report for details     SWALLOW ASSESSMENT CLINICAL IMPRESSIONS AND RATIONALE  Diet Consistency Recommendations: thin liquids (level 0), regular diet    Recommended Feeding/Eating Techniques: General safe swallow strategies: small sips/bites, slow pace, minimize distractions during PO intake   Medication Administration Recommendations: one at a time with puree or thin liquid  Instrumental Assessment Recommendations: instrumental evaluation not recommended at this time     Assessment & Plan   CLINICAL IMPRESSIONS   Medical Diagnosis: Parkinson's disease with dyskinesia and fluctuating manifestations (H) (G20.B2)    Cervical stenosis of spinal canal (M48.02)    Cervical radiculopathy (M54.12)    Dysphagia, unspecified type (R13.10)    Treatment Diagnosis: Minimal oropharyngeal dysphagia   Impression/Assessment: Pt is a 69 year old male with swallow complaints. The following significant findings have been identified: impaired swallowing, characterized by intermittent premature spillage/delay with swallowing and feeling of the pill getting stuck at his collarbone. Tablet moved through oropharynx without difficulty, but when visualized was seen stuck in the distal esophagus. His swallowing is strong and safe oropharyngeally; would recommend further GI evaluation for his pill dysphagia.     PLAN OF CARE  Evaluation only     Recommended Referrals to Other Professionals: GI    Education Assessment:   Learner/Method: Patient;Listening;Pictures/Video;No Barriers to Learning  Education Comments: Trained pt on anatomy/physiology of swallowing,  results of today's study and diet recommendations    Risks and benefits of evaluation/treatment have been explained.   Patient/Family/caregiver agrees with Plan of Care.     Evaluation Time:    SLP Eval: oral/pharyngeal swallow function, clinical minutes (29081): 15  SLP Eval: VideoFluoroscopic Swallow function Minutes (79348): 25    Evaluation Only     Signing Clinician: ALEXA Nascimento

## 2025-05-22 ENCOUNTER — THERAPY VISIT (OUTPATIENT)
Dept: SPEECH THERAPY | Facility: CLINIC | Age: 70
End: 2025-05-22
Payer: COMMERCIAL

## 2025-05-22 DIAGNOSIS — G20.B2 PARKINSON'S DISEASE WITH DYSKINESIA AND FLUCTUATING MANIFESTATIONS (H): Primary | ICD-10-CM

## 2025-05-22 DIAGNOSIS — R13.10 DYSPHAGIA, UNSPECIFIED TYPE: ICD-10-CM

## 2025-05-22 NOTE — Clinical Note
Tablet moved through oropharynx without difficulty, but when visualized was seen stuck in the distal esophagus. His swallowing is strong and safe oropharyngeally; would recommend further GI evaluation for his pill dysphagia.

## 2025-05-22 NOTE — PATIENT INSTRUCTIONS
"Swallow Study Results:    Today we see that the muscles in your throat that push food and liquid through are very strong. They move slightly slower than normal, which is typical with Parkinson's.  But they are very strong, so we don't see any food or liquid getting stuck in your throat    We did not see any food or liquid going \"down the wrong pipe\" towards your airway.      When you swallowed the \"fake\" pill, you felt it was stuck near your collarbone  We saw it move through your throat without difficulty  The pill actually got stuck at the bottom of your esophagus  This often happens where things are stuck in the esophagus, but we feel as if they are stuck higher up near the throat  A spoonful of pudding pushed the pill through into the stomach      I would consider trying taking your pills in a bite of applesauce, pudding or yogurt to see if this helps (instead of taking the pill with water, followed by yogurt)    I think the issue is with your esophagus. I will recommend to Dr. Haro you have further testing of the esophagus      JIMMY Nascimento (music), MA, CCC-SLP   Speech Language Pathologist  [place] Trained Vocologist   [facility]  Dept. of Otolaryngology  Department of Rehabilitation Services  [address] [place] [id number]  Email: [email or web address]   Phone: [phone]  Pronouns: she/her/hers    "

## 2025-06-11 ENCOUNTER — OFFICE VISIT (OUTPATIENT)
Dept: ORTHOPEDICS | Facility: CLINIC | Age: 70
End: 2025-06-11
Payer: COMMERCIAL

## 2025-06-11 DIAGNOSIS — G20.B2 PARKINSON'S DISEASE WITH DYSKINESIA AND FLUCTUATING MANIFESTATIONS (H): ICD-10-CM

## 2025-06-11 DIAGNOSIS — M48.02 CERVICAL STENOSIS OF SPINAL CANAL: Primary | ICD-10-CM

## 2025-06-11 DIAGNOSIS — R29.898 UPPER EXTREMITY WEAKNESS: ICD-10-CM

## 2025-06-11 PROCEDURE — 99215 OFFICE O/P EST HI 40 MIN: CPT | Mod: GC | Performed by: ORTHOPAEDIC SURGERY

## 2025-06-11 RX ORDER — CEFAZOLIN SODIUM 2 G/50ML
2 SOLUTION INTRAVENOUS
OUTPATIENT
Start: 2025-06-11

## 2025-06-11 RX ORDER — CEFAZOLIN SODIUM 2 G/50ML
2 SOLUTION INTRAVENOUS SEE ADMIN INSTRUCTIONS
OUTPATIENT
Start: 2025-06-11

## 2025-06-11 NOTE — PROGRESS NOTES
Teaching Flowsheet     Visit Type: In Clinic    Time Start: 1100  Time End: 1130  Total Time Spent: 30 min.    Surgeon: Dr. Haro   Location of Surgery (known or anticipated): [facility]  Type of Anesthesia: General  Worker's Compensation Procedure: No    Pertinent Medical History: Parkinsons  Were medical conditions reviewed and appropriate for location? Yes  BMI: Normal BMI    Relevant Diagnosis: Cervical Stenosis  Teaching Topic: Surgery    Person(s) involved in teaching:   Patient and Wife  : No.   Verified Patient's Phone Number: YES: [phone]    Caregiver//  Name: Debra Lawson  Phone Number: [phone]   Relationship: Wife  Consent to Communicate on file: Yes  Authorization to Share Protected Health Information- Person to person communication signed by patient and authorized the following person or people:      Motivation Level:  Asks Questions: Yes  Eager to Learn: Yes  Cooperative: Yes  Receptive (willing/able to accept information): Yes  Any cultural factors/Yazdanism beliefs that may influence understanding or compliance? No     Patient and Family demonstrates understanding of the following:  Reason for the appointment, diagnosis and treatment plan: Yes  Knowledge of proper use of medications and conditions for which they are ordered (with special attention to potential side effects or drug interactions): Yes  Which situations necessitate calling provider and whom to contact: Yes     Teaching Concerns Addressed:   Proper use and care of medical equip, care aids, etc.: Yes  Nutritional needs and diet plan: Yes  Pain management techniques: Yes  Wound Care: Yes  How and/when to access community resources: Yes  Need for pre-op with in 30 days: YES, to be done with PAC.      Does patient have difficulty getting a ride to appointments (post-ops, PT/OT): No  Patient's plan after discharge: home with family or spouse     Instructional Materials Used/Given:  two bottles  of chlorhexidine soap and a surgery packet given to patient in clinic. Instructed patient to buy or get two 8 ounces bottles of antiseptic surgical soap called 4% CHG. Common name brand of this soap are Hibiclens and Exidine. I told them they can find this at their local pharmacy, clinic or retail store. If they have trouble finding it, I told them to ask their pharmacist to help them find a substitute.   - Important Contact Info/Phone Numbers: emphasizing clinic number [phone] and after hours number [phone]  - Map/location of surgery and follow-up appointments  - Showering instructions  - Stoplight Tool     -Next step: schedule a surgery date and schedule a pre-op with PAC.    Patient Supplied Answers To EAT Questionnaire      6/11/2025    11:00 AM   Eating Assessment Tool (EAT-10)   My swallowing problem has caused me to lose weight 0   My swallowing problem interferes with my ability to go out for meals 0   Swallowing liquids takes extra effort 0   Swallowing solids takes extra effort 0   Swallowing pills takes extra effort 2   Swallowing is painful 0   The pleasure of eating is affected by my swallowing 0   When I swallow food sticks in my throat 0   I cough when I eat 0   Swallowing is stressful 0   EAT-10 2       Anterior Approach Cervical Surgery    Surgery from the front of your neck can lead to swallowing difficulties (dysphagia). There are exercises that can be done before and after surgery to lower the risk of complications. These are called tracheal traction or tracheal stretch exercises.    Start by placing your right thumb on the right side of the cartilage in the front of the neck. Gently push this tissue 1 cm (about 0.39 in) towards the left over the midline. Let the tissue relax in between each stretch. Do the same on the opposite side, pushing the left side of the cartilage 1 cm (about 0.39 in) to the right over the midline.    Start these stretches 3 days before surgery. Do 15 stretches  each way 3-5 times daily. This can be started post operatively, once pain is tolerable. Take care to not push directly over the incision and wash your hands before each stretching episode.        After surgery, if you have new difficulty swallowing, please contact the Ortho Spine Nursing staff at [phone].    Signs and Symptoms to watch out for after Surgery:  New swelling or tightness around the throat  Difficulty swallowing, Trouble swallowing pills, Trouble with swallowing solids or liquids  Swallowing is stressful or coughing when eating  Call 911 if it becomes difficult to breath as this is a Medical Emergency.    Amaya Bergman RN

## 2025-06-11 NOTE — LETTER
6/11/2025      Jonathan Lawson  [address]  [facility] [address]      Dear Colleague,    Thank you for referring your patient, Jonathan Lawson, to the [facility] [place]. Please see a copy of my visit note below.    I have reviewed and updated the patient's Past Medical History, Social History, Family History and Medication List.    ALLERGIES  Patient has no known allergies.    Spine Surgery Follow Up    REFERRING PHYSICIAN: Jermain Haro   PRIMARY CARE PHYSICIAN: No Ref-Primary, Physician           Chief Complaint:   RECHECK (return neck  with the cause of arm pain)      History of Present Illness:  Symptom Profile Including: location of symptoms, onset, severity, exacerbating/alleviating factors, previous treatments:        Jonathan Lawson is a 69 year old male with history of Parkinson's disease as well as a C6-C7 ACDF in 2002 via a left-sided approach who presents today for follow-up.  The patient was last seen on 04/03/2025.  At that time he was seen for radicular symptoms predominantly going into the left upper extremity but also symptoms into the right shoulder.  He was noting a burning sensation over his bilateral deltoids with weakness about his shoulder.  Plan at that time was for the patient undergo a C7-T1 interlaminar epidural steroid injection as well as obtain a swallow study to see if his anterior osteophytes were causing mechanical obstruction to his swallowing.    Today, he states that he has persistent symptoms.  He states that he really did not have any significant change in his symptoms following the corticosteroid injection.  No change in his symptoms immediately following the injection.  States that several weeks later he may have noted a mild improvement in his symptoms.  Patient had a swallow study performed that demonstrated intermittent premature delay with swallowing but passage through the oropharynx but the pill did get  stuck in his distal esophagus with recommendation for a GI referral.    He states that he has persistent burning discomfort over the areas of the bilateral deltoids left greater than right.  He says that he is concerned about his right deltoid becoming weaker over time.  Denies any significant change in his manual dexterity of his fingers, no balance changes or bowel or bladder changes recently.    ANNA Scores:  Oswestry (ANNA) Questionnaire         No data to display                     Neck Disability Index (NDI) Questionnaire        6/11/2025     9:35 AM   Neck Disability Index (NDI)   Neck Disability Index: Count 10   NDI: Total Score = SUM (points for all 10 findings) 17   Neck Disability in Percent = (Total Score) / 50 * 100 34 (%)            Visual Analog Pain Scale  Back Pain Scale 0-10: 0  Right leg pain: 0  Left leg pain: 0  Neck Pain Scale 0-10: 0  Right arm pain: 0 (Shocking tingling pain intermittent down arm)  Left arm pain: 0 (Shocking tingling pain intermittent down arm)    PROMIS-10 Scores:            Physical Exam:   PHYSICAL EXAM:   Constitutional - Patient is healthy, well-nourished and appears stated age   Respiratory - Patient is breathing normally and in no respiratory distress.   Skin - No suspicious rashes or lesions.   Psychiatric - Normal mood and affect.   Cardiovascular - Extremities warm and well perfused.   Eyes - Visual acuity is normal to the written word.   ENT - Hearing intact to the spoken word.   GI - No abdominal distention.   Musculoskeletal - Non-antalgic gait without use of assistive devices.        Cervical spine:    Appearance - Well healed left anterior transverse surgical scar     Upper EXTREMITY Left Right   Shoulder Abduction 3/5 5/5   Shoulder Flexion 5/5 5/5   Elbow Flexion 4/5 5/5   Elbow Extension 5/5 5/5   Wrist Extension 4/5 5/5   Wrist Flexion 5/5 5/5   Finger Adbuction 5/5 5/5                    Neurologic - Sensation intact to light touch bilaterally.                             REFLEXES Left Right   Biceps 2+ 2+   Triceps 2+ 2+   Brachioradialis 2+ 2+   Ruiz's No No   Sustained Clonus No No            Imaging:   I ordered and independently reviewed new radiographs at this clinic visit. The results were discussed with the patient.  Findings include:    No new radiographs were performed today but again previous cervical spine x-rays were reviewed which demonstrates multilevel cervical spondylosis with large anterior disc osteophytes especially at C3-C4 and C4-C5 with maintained cervical lordosis.    Patient's MRI was again reviewed that demonstrates multilevel cervical spondylosis with a retrolisthesis at C3-C4 with severe spinal cord stenosis at that level.  There is also significant stenosis at C4-C5 and C5-C6 with significant foraminal stenosis at those levels.    X-ray video swallow study performed on 05/22/2025  Impression:   1. There is penetration with thin barium consistency, otherwise no  penetration or aspiration with any consistency of barium.   2. Please see the speech pathologist report for further details.    Impression/Assessment: Pt is a 69 year old male with swallow complaints. The following significant findings have been identified: impaired swallowing, characterized by intermittent premature spillage/delay with swallowing and feeling of the pill getting stuck at his collarbone. Tablet moved through oropharynx without difficulty, but when visualized was seen stuck in the distal esophagus. His swallowing is strong and safe oropharyngeally; would recommend further GI evaluation for his pill dysphagia.              Assessment and Plan:   Assessment:  69 year old male with multilevel cervical spondylosis with significant spinal stenosis from C3 is 36 C3-C6 with symptoms of cervical radiculopathy with significant weakness with the left shoulder abduction, elbow flexion and wrist extension consistent with the levels of the disease.    I discussion with the  patient regarding his current symptoms, imaging studies and ongoing plan.  Did not seem that he had significant change in his symptoms with the injection.  He did have the video swallow study that did not show any significant issues with swallowing at the level of the oropharynx but there was some slowing distally just above the stomach.  We again reviewed his imaging study with the significant multilevel spondylosis superior to his previous ACDF.  We discussed treatment options when these including nonoperative management which would include physical therapy, oral medications as well as injections.  He seems to have progressive weakness in the upper extremities and has not had significant improvement with his previous nonoperative measures and thus we discussed surgical intervention.  Given that he had no significant issues with swallowing and the oropharynx they recommend an anterior approach.  The recommended surgical intervention would be a C3-C6 ACDF.  We discussed potentially just targeting at the C3-4 level as this is his most significant disease but he does have degeneration at the C4-C5 and C5-C6 levels with corresponding weakness at the nerve root levels due to this would recommend surgery to address all 3 levels.    Risks of this surgery include risk of infection, risk of dural tear resulting in CSF leak which might result in headaches, or possible need for lumbar drain, or possible revision surgery in the setting of a persistent leak. Possible nerve root injury resulting in numbness weakness or paralysis into the arms or legs. Possible radiculitis which could result in similar symptoms or could result in significant neurogenic type pain. Risk of incomplete decompression which might require revision surgery in the future.  Risk of adjacent segment problems requiring surgery in the future. Risk of incomplete relief of symptoms possibly requiring revision surgery in the future. Furthermore, although rare,  there are risks of major vessel injury such as to the vertebral artery or major organ injury such as to the esophagus or trachea from the surgery.  There are risks of dysphagia or dysphonia which can make it hard to swallow or talk. I explained that in fact most patients will have some period of swallowing difficulty following the surgery.  In some cases these things occur as a result of laryngeal nerve injury, and we discussed this possibility as well. There is a risk of hematoma formation requiring urgent return to the OR for airway compromise.  There is a risk of blood clots in the legs or the lungs.  Lastly, although rare, there are certainly risks of the anesthetic including stroke heart attack and death.    For bone graft we plan to use Allograft, and the allograft is jessica DBX bone material from [employer], iliac crest autograft which is harvested through a separate skin and fascial incision.  I have discussed the risks and benefits of these and explained that allograft has a slightly lower healing rate.  Autograft has a higher healing rate, but requires a separate skin and fascial incision and tends to have more discomfort or pain in the recovery.  The patient understands these risks and benefits of the choice.       No further conservative care is indicated, and the surgery is medically necessary for the following reasons:    There is no indication for further physical therapy in this case.  Further physical therapy would delay necessary medical treatment and prolong the patient's suffering with no benefit and the delay would increase the risk of neurologic decline and/or symptom worsening unnecessarily, with no benefit to the patient and possible harm.       There are no untreated, underlying mental health conditions (including but not limited to psychological conditions or drug or alcohol abuse) that will preclude an appropriate recovery from this surgery or that are contraindications to proceeding with  surgery.      After discussion of the risk and benefits of this procedure the patient and his wife elected to proceed.  Patient will meet with our schedulers and will require a preoperative appointment prior to the surgery       Plan:  Plan to proceed with a C3-C6 ACDF  Patient will meet with our schedulers today  Patient will require a preoperative H&P  Plan to follow-up on the day of the surgical procedure.  He may call and schedule another appointment or a virtual appointment if any other additional questions arise that he would like addressed prior to the procedure.    Patient was seen and discussed with Dr. Tahir Lopez MD, PGY-4, Orthopedic resident     Respectfully,  Jermain Haro MD  Orthopaedic Spine Surgery  Dept Orthopaedic Surgery, [facility]  [facility] [place]: [phone]    Dictation Disclaimer: Some of this Note has been completed with voice-recognition dictation software. Although errors are generally corrected real-time, there is the potential for a rare error to be present in the completed chart.      Attestation signed by Jermain Haro MD at 6/11/2025 10:37 PM:  Patient is 69 year old male with previous C6-7 ACDF, severe cervical stenosis at C3-4 with cord effacement, severe foraminal stenosis at C4-5 and C5-6 with profound weakness in left deltoid and biceps.  He is now experiencing weakness in right upper extremity. Due to progressive weakness I recommend surgery to address neurologic compression. Given pathology I recommend decompression at C3-C6 with discectomy. Due to advanced spondylosis disc replacement is not a good option. I recommend three level ACDF C3-C6.  Will use local autograft and supplement with demineralized bone fibers for fusion.  Discussed risk of nonunion which for three level fusion is about 15% and can lead to persistent pain and require additional surgery.  Discussed risk of dysphagia or dysphonia. He does have dysphagia but speech therapy  evaluation including video swallow study does not show this is stemming from the cervical region.  Still a chance of worsening dysphagia postoperatively and discussed this could lead to need for feeding tube or even PEG tube placement.  Patient wishes to proceed with surgery.     I reviewed the patient's history, physical examination and imaging studies with the Orthopedic Surgery Resident.  In addition I individually examined the patient and developed the treatment plan. I personally performed the substantive portion of the encounter today including developing the treatment plan and counseling the patient regarding treatment options. I have reviewed and edited the clinical documentation as appropriate and agree with the assessment and plan as documented.     Time spent on this clinical encounter by me, independent of the Resident, was 50 minutes. This included chart review, history and physical examination, patient counseling, care coordination and documentation.     Jermain Haro MD  Orthopedic Spine Surgeon  , Department of Orthopedic Surgery      Teaching Flowsheet     Visit Type: In Clinic    Time Start: 1100  Time End: 1130  Total Time Spent: 30 min.    Surgeon: Dr. Haro   Location of Surgery (known or anticipated): [facility]  Type of Anesthesia: General  Worker's Compensation Procedure: No    Pertinent Medical History: Parkinsons  Were medical conditions reviewed and appropriate for location? Yes  BMI: Normal BMI    Relevant Diagnosis: Cervical Stenosis  Teaching Topic: Surgery    Person(s) involved in teaching:   Patient and Wife  : No.   Verified Patient's Phone Number: YES: [phone]    Caregiver//  Name: Debra Lawson  Phone Number: [phone]   Relationship: Wife  Consent to Communicate on file: Yes  Authorization to Share Protected Health Information- Person to person communication signed by patient and authorized the following  person or people:      Motivation Level:  Asks Questions: Yes  Eager to Learn: Yes  Cooperative: Yes  Receptive (willing/able to accept information): Yes  Any cultural factors/Holiness beliefs that may influence understanding or compliance? No     Patient and Family demonstrates understanding of the following:  Reason for the appointment, diagnosis and treatment plan: Yes  Knowledge of proper use of medications and conditions for which they are ordered (with special attention to potential side effects or drug interactions): Yes  Which situations necessitate calling provider and whom to contact: Yes     Teaching Concerns Addressed:   Proper use and care of medical equip, care aids, etc.: Yes  Nutritional needs and diet plan: Yes  Pain management techniques: Yes  Wound Care: Yes  How and/when to access community resources: Yes  Need for pre-op with in 30 days: YES, to be done with PAC.      Does patient have difficulty getting a ride to appointments (post-ops, PT/OT): No  Patient's plan after discharge: home with family or spouse     Instructional Materials Used/Given:  two bottles of chlorhexidine soap and a surgery packet given to patient in clinic. Instructed patient to buy or get two 8 ounces bottles of antiseptic surgical soap called 4% CHG. Common name brand of this soap are Hibiclens and Exidine. I told them they can find this at their local pharmacy, clinic or retail store. If they have trouble finding it, I told them to ask their pharmacist to help them find a substitute.   - Important Contact Info/Phone Numbers: emphasizing clinic number [phone] and after hours number [phone]  - Map/location of surgery and follow-up appointments  - Showering instructions  - Stoplight Tool     -Next step: schedule a surgery date and schedule a pre-op with PAC.    Patient Supplied Answers To EAT Questionnaire      6/11/2025    11:00 AM   Eating Assessment Tool (EAT-10)   My swallowing problem has caused me to lose  weight 0   My swallowing problem interferes with my ability to go out for meals 0   Swallowing liquids takes extra effort 0   Swallowing solids takes extra effort 0   Swallowing pills takes extra effort 2   Swallowing is painful 0   The pleasure of eating is affected by my swallowing 0   When I swallow food sticks in my throat 0   I cough when I eat 0   Swallowing is stressful 0   EAT-10 2       Anterior Approach Cervical Surgery    Surgery from the front of your neck can lead to swallowing difficulties (dysphagia). There are exercises that can be done before and after surgery to lower the risk of complications. These are called tracheal traction or tracheal stretch exercises.    Start by placing your right thumb on the right side of the cartilage in the front of the neck. Gently push this tissue 1 cm (about 0.39 in) towards the left over the midline. Let the tissue relax in between each stretch. Do the same on the opposite side, pushing the left side of the cartilage 1 cm (about 0.39 in) to the right over the midline.    Start these stretches 3 days before surgery. Do 15 stretches each way 3-5 times daily. This can be started post operatively, once pain is tolerable. Take care to not push directly over the incision and wash your hands before each stretching episode.        After surgery, if you have new difficulty swallowing, please contact the Ortho Spine Nursing staff at [phone].    Signs and Symptoms to watch out for after Surgery:  New swelling or tightness around the throat  Difficulty swallowing, Trouble swallowing pills, Trouble with swallowing solids or liquids  Swallowing is stressful or coughing when eating  Call 911 if it becomes difficult to breath as this is a Medical Emergency.    Amaya Bergman RN          Again, thank you for allowing me to participate in the care of your patient.        Sincerely,        Jermain Haro MD    Electronically signed

## 2025-06-11 NOTE — PROGRESS NOTES
I have reviewed and updated the patient's Past Medical History, Social History, Family History and Medication List.    ALLERGIES  Patient has no known allergies.    Spine Surgery Follow Up    REFERRING PHYSICIAN: Jermain Haro   PRIMARY CARE PHYSICIAN: No Ref-Primary, Physician           Chief Complaint:   RECHECK (return neck  with the cause of arm pain)      History of Present Illness:  Symptom Profile Including: location of symptoms, onset, severity, exacerbating/alleviating factors, previous treatments:        Jonathan Lawson is a 69 year old male with history of Parkinson's disease as well as a C6-C7 ACDF in 2002 via a left-sided approach who presents today for follow-up.  The patient was last seen on 04/03/2025.  At that time he was seen for radicular symptoms predominantly going into the left upper extremity but also symptoms into the right shoulder.  He was noting a burning sensation over his bilateral deltoids with weakness about his shoulder.  Plan at that time was for the patient undergo a C7-T1 interlaminar epidural steroid injection as well as obtain a swallow study to see if his anterior osteophytes were causing mechanical obstruction to his swallowing.    Today, he states that he has persistent symptoms.  He states that he really did not have any significant change in his symptoms following the corticosteroid injection.  No change in his symptoms immediately following the injection.  States that several weeks later he may have noted a mild improvement in his symptoms.  Patient had a swallow study performed that demonstrated intermittent premature delay with swallowing but passage through the oropharynx but the pill did get stuck in his distal esophagus with recommendation for a GI referral.    He states that he has persistent burning discomfort over the areas of the bilateral deltoids left greater than right.  He says that he is concerned about his right deltoid becoming weaker over time.   Denies any significant change in his manual dexterity of his fingers, no balance changes or bowel or bladder changes recently.    ANNA Scores:  Oswestry (ANNA) Questionnaire         No data to display                     Neck Disability Index (NDI) Questionnaire        6/11/2025     9:35 AM   Neck Disability Index (NDI)   Neck Disability Index: Count 10   NDI: Total Score = SUM (points for all 10 findings) 17   Neck Disability in Percent = (Total Score) / 50 * 100 34 (%)            Visual Analog Pain Scale  Back Pain Scale 0-10: 0  Right leg pain: 0  Left leg pain: 0  Neck Pain Scale 0-10: 0  Right arm pain: 0 (Shocking tingling pain intermittent down arm)  Left arm pain: 0 (Shocking tingling pain intermittent down arm)    PROMIS-10 Scores:            Physical Exam:   PHYSICAL EXAM:   Constitutional - Patient is healthy, well-nourished and appears stated age   Respiratory - Patient is breathing normally and in no respiratory distress.   Skin - No suspicious rashes or lesions.   Psychiatric - Normal mood and affect.   Cardiovascular - Extremities warm and well perfused.   Eyes - Visual acuity is normal to the written word.   ENT - Hearing intact to the spoken word.   GI - No abdominal distention.   Musculoskeletal - Non-antalgic gait without use of assistive devices.        Cervical spine:    Appearance - Well healed left anterior transverse surgical scar     Upper EXTREMITY Left Right   Shoulder Abduction 3/5 5/5   Shoulder Flexion 5/5 5/5   Elbow Flexion 4/5 5/5   Elbow Extension 5/5 5/5   Wrist Extension 4/5 5/5   Wrist Flexion 5/5 5/5   Finger Adbuction 5/5 5/5                    Neurologic - Sensation intact to light touch bilaterally.                            REFLEXES Left Right   Biceps 2+ 2+   Triceps 2+ 2+   Brachioradialis 2+ 2+   Ruiz's No No   Sustained Clonus No No            Imaging:   I ordered and independently reviewed new radiographs at this clinic visit. The results were discussed with the  patient.  Findings include:    No new radiographs were performed today but again previous cervical spine x-rays were reviewed which demonstrates multilevel cervical spondylosis with large anterior disc osteophytes especially at C3-C4 and C4-C5 with maintained cervical lordosis.    Patient's MRI was again reviewed that demonstrates multilevel cervical spondylosis with a retrolisthesis at C3-C4 with severe spinal cord stenosis at that level.  There is also significant stenosis at C4-C5 and C5-C6 with significant foraminal stenosis at those levels.    X-ray video swallow study performed on 05/22/2025  Impression:   1. There is penetration with thin barium consistency, otherwise no  penetration or aspiration with any consistency of barium.   2. Please see the speech pathologist report for further details.    Impression/Assessment: Pt is a 69 year old male with swallow complaints. The following significant findings have been identified: impaired swallowing, characterized by intermittent premature spillage/delay with swallowing and feeling of the pill getting stuck at his collarbone. Tablet moved through oropharynx without difficulty, but when visualized was seen stuck in the distal esophagus. His swallowing is strong and safe oropharyngeally; would recommend further GI evaluation for his pill dysphagia.              Assessment and Plan:   Assessment:  69 year old male with multilevel cervical spondylosis with significant spinal stenosis from C3 is 36 C3-C6 with symptoms of cervical radiculopathy with significant weakness with the left shoulder abduction, elbow flexion and wrist extension consistent with the levels of the disease.    I discussion with the patient regarding his current symptoms, imaging studies and ongoing plan.  Did not seem that he had significant change in his symptoms with the injection.  He did have the video swallow study that did not show any significant issues with swallowing at the level of the  oropharynx but there was some slowing distally just above the stomach.  We again reviewed his imaging study with the significant multilevel spondylosis superior to his previous ACDF.  We discussed treatment options when these including nonoperative management which would include physical therapy, oral medications as well as injections.  He seems to have progressive weakness in the upper extremities and has not had significant improvement with his previous nonoperative measures and thus we discussed surgical intervention.  Given that he had no significant issues with swallowing and the oropharynx they recommend an anterior approach.  The recommended surgical intervention would be a C3-C6 ACDF.  We discussed potentially just targeting at the C3-4 level as this is his most significant disease but he does have degeneration at the C4-C5 and C5-C6 levels with corresponding weakness at the nerve root levels due to this would recommend surgery to address all 3 levels.    Risks of this surgery include risk of infection, risk of dural tear resulting in CSF leak which might result in headaches, or possible need for lumbar drain, or possible revision surgery in the setting of a persistent leak. Possible nerve root injury resulting in numbness weakness or paralysis into the arms or legs. Possible radiculitis which could result in similar symptoms or could result in significant neurogenic type pain. Risk of incomplete decompression which might require revision surgery in the future.  Risk of adjacent segment problems requiring surgery in the future. Risk of incomplete relief of symptoms possibly requiring revision surgery in the future. Furthermore, although rare, there are risks of major vessel injury such as to the vertebral artery or major organ injury such as to the esophagus or trachea from the surgery.  There are risks of dysphagia or dysphonia which can make it hard to swallow or talk. I explained that in fact most  patients will have some period of swallowing difficulty following the surgery.  In some cases these things occur as a result of laryngeal nerve injury, and we discussed this possibility as well. There is a risk of hematoma formation requiring urgent return to the OR for airway compromise.  There is a risk of blood clots in the legs or the lungs.  Lastly, although rare, there are certainly risks of the anesthetic including stroke heart attack and death.    For bone graft we plan to use Allograft, and the allograft is jessica DBX bone material from [employer], iliac crest autograft which is harvested through a separate skin and fascial incision.  I have discussed the risks and benefits of these and explained that allograft has a slightly lower healing rate.  Autograft has a higher healing rate, but requires a separate skin and fascial incision and tends to have more discomfort or pain in the recovery.  The patient understands these risks and benefits of the choice.       No further conservative care is indicated, and the surgery is medically necessary for the following reasons:    There is no indication for further physical therapy in this case.  Further physical therapy would delay necessary medical treatment and prolong the patient's suffering with no benefit and the delay would increase the risk of neurologic decline and/or symptom worsening unnecessarily, with no benefit to the patient and possible harm.       There are no untreated, underlying mental health conditions (including but not limited to psychological conditions or drug or alcohol abuse) that will preclude an appropriate recovery from this surgery or that are contraindications to proceeding with surgery.      After discussion of the risk and benefits of this procedure the patient and his wife elected to proceed.  Patient will meet with our schedulers and will require a preoperative appointment prior to the surgery       Plan:  Plan to proceed with a  C3-C6 ACDF  Patient will meet with our schedulers today  Patient will require a preoperative H&P  Plan to follow-up on the day of the surgical procedure.  He may call and schedule another appointment or a virtual appointment if any other additional questions arise that he would like addressed prior to the procedure.    Patient was seen and discussed with Dr. Tahir Lopez MD, PGY-4, Orthopedic resident     Respectfully,  Jermain Haro MD  Orthopaedic Spine Surgery  Dept Orthopaedic Surgery, [facility]  [facility] [place]: [phone]    Dictation Disclaimer: Some of this Note has been completed with voice-recognition dictation software. Although errors are generally corrected real-time, there is the potential for a rare error to be present in the completed chart.

## 2025-06-11 NOTE — NURSING NOTE
Reason For Visit:   Chief Complaint   Patient presents with    RECHECK     return neck  with the cause of arm pain       Primary MD: No Ref-Primary, Physician  Ref. MD: EST    ?  No  Occupation Retired.        Date of injury: No  Type of injury: Parkinson's.     Date of surgery: 2002  Type of surgery: Cervical 6-7 fusion.     Smoker: No  Request smoking cessation information: No    There were no vitals taken for this visit.    Pain Assessment  Patient Currently in Pain: Denies      Neck Disability Index (NDI) Questionnaire        6/11/2025     9:35 AM   Neck Disability Index (NDI)   Neck Disability Index: Count 10   NDI: Total Score = SUM (points for all 10 findings) 17   Neck Disability in Percent = (Total Score) / 50 * 100 34 (%)              Visual Analog Pain Scale  Back Pain Scale 0-10: 0  Right leg pain: 0  Left leg pain: 0  Neck Pain Scale 0-10: 0  Right arm pain: 0 (Shocking tingling pain intermittent down arm)  Left arm pain: 0 (Shocking tingling pain intermittent down arm)    Promis 10 Assessment         No data to display                         Janina Sutton LPN

## 2025-06-17 ENCOUNTER — TELEPHONE (OUTPATIENT)
Dept: OTOLARYNGOLOGY | Facility: CLINIC | Age: 70
End: 2025-06-17
Payer: COMMERCIAL

## 2025-06-17 NOTE — TELEPHONE ENCOUNTER
Left Voicemail (2nd Attempt) and Sent Mychart (2nd Attempt) for the patient to call back and schedule the following:    Appointment Type: New ENT SLP  Provider: Micaela Rincon SLP   Appt date: R/S 7/28 TO NEXT OPEN  Specialty phone number: [phone]

## 2025-06-18 ENCOUNTER — TELEPHONE (OUTPATIENT)
Dept: ORTHOPEDICS | Facility: CLINIC | Age: 70
End: 2025-06-18
Payer: COMMERCIAL

## 2025-06-18 NOTE — TELEPHONE ENCOUNTER
Phoned patient to schedule surgery with Dr Haro. I left him my direct number to call back when he is able. [phone]

## 2025-06-19 NOTE — TELEPHONE ENCOUNTER
Incoming call to schedule surgery with Dr. Haro3    Spoke with: Debra (spouse)    Reason for Surgical Date: Patient Preference : requested late August, was offered 8/26 & 8/28     Date of Surgery: 8/28/25    Estimated Arrival time Discussed with Patient:  No    Location of surgery: [facility]     Pre-Op H&P: Scheduled with PAC 8/13/25 at 10:00     Imaging: No     Additional Pre-Op Appointments: No     Post-Op Appt Date w/ NP/PA:   N/A    Post-Op Appt Date w/ Surgeon   10/8/25 at 10:30am     Additional Post-Op Appointments: No     Discussed with patient PAC RN will provide arrival time and instructions for surgery at the time of the appointment: [[place] locations only]: Yes    Standard Surgery Packet Sent: Yes 06/11/25  via Received in clinic by RN       Additional Comments: N/A      Indigo Mendenhall MA on 6/19/2025 at 3:41 PM

## 2025-06-24 NOTE — TELEPHONE ENCOUNTER
FUTURE VISIT INFORMATION      SURGERY INFORMATION:  Date: 8/28/25  Location: [facility]  Surgeon:  Jermain Haro MD   Anesthesia Type:  General  Procedure: Cervical 3-Cervical 6 Anterior Cervical Decompression and Fusion with neuromonitoring   Consult: 6/11/25    RECORDS REQUESTED FROM:         Pertinent Medical History: HLD, Parkinson's Disease, Lung nodule, Histoplasmosis, Personal history of nicotine dependence,  Obstruction of esophagus     Most recent EKG+ Tracing: 3/19/25 [facility]    Most recent ECHO: 5/2/25, [facility]    Most recent PFT's: 4/11/19, internal

## 2025-07-28 ENCOUNTER — PRE VISIT (OUTPATIENT)
Dept: OTOLARYNGOLOGY | Facility: CLINIC | Age: 70
End: 2025-07-28

## 2025-07-30 ENCOUNTER — TELEPHONE (OUTPATIENT)
Dept: ORTHOPEDICS | Facility: CLINIC | Age: 70
End: 2025-07-30
Payer: COMMERCIAL

## 2025-08-05 ENCOUNTER — TELEPHONE (OUTPATIENT)
Dept: ORTHOPEDICS | Facility: CLINIC | Age: 70
End: 2025-08-05
Payer: COMMERCIAL

## 2025-08-05 DIAGNOSIS — G20.B2 PARKINSON'S DISEASE WITH DYSKINESIA AND FLUCTUATING MANIFESTATIONS (H): ICD-10-CM

## 2025-08-05 DIAGNOSIS — M48.02 CERVICAL STENOSIS OF SPINAL CANAL: ICD-10-CM

## 2025-08-05 DIAGNOSIS — R13.10 DYSPHAGIA, UNSPECIFIED TYPE: ICD-10-CM

## 2025-08-05 DIAGNOSIS — M54.12 CERVICAL RADICULOPATHY: ICD-10-CM

## 2025-08-05 RX ORDER — GABAPENTIN 300 MG/1
300 CAPSULE ORAL 3 TIMES DAILY
Qty: 90 CAPSULE | Refills: 3 | Status: SHIPPED | OUTPATIENT
Start: 2025-08-05

## 2025-08-10 LAB
ABO + RH BLD: NORMAL
BLD GP AB SCN SERPL QL: NEGATIVE
SPECIMEN EXP DATE BLD: NORMAL

## 2025-08-11 ENCOUNTER — APPOINTMENT (OUTPATIENT)
Dept: LAB | Facility: CLINIC | Age: 70
End: 2025-08-11
Payer: COMMERCIAL

## 2025-08-11 ENCOUNTER — OFFICE VISIT (OUTPATIENT)
Dept: SURGERY | Facility: CLINIC | Age: 70
End: 2025-08-11
Attending: ORTHOPAEDIC SURGERY
Payer: COMMERCIAL

## 2025-08-11 ENCOUNTER — LAB (OUTPATIENT)
Dept: LAB | Facility: CLINIC | Age: 70
End: 2025-08-11
Payer: COMMERCIAL

## 2025-08-11 VITALS
SYSTOLIC BLOOD PRESSURE: 149 MMHG | DIASTOLIC BLOOD PRESSURE: 80 MMHG | RESPIRATION RATE: 16 BRPM | OXYGEN SATURATION: 94 % | HEART RATE: 67 BPM | BODY MASS INDEX: 27.3 KG/M2 | TEMPERATURE: 97.7 F | WEIGHT: 190.7 LBS | HEIGHT: 70 IN

## 2025-08-11 DIAGNOSIS — R54 FRAILTY: ICD-10-CM

## 2025-08-11 DIAGNOSIS — R29.898 UPPER EXTREMITY WEAKNESS: ICD-10-CM

## 2025-08-11 DIAGNOSIS — Z01.818 PRE-OP EVALUATION: Primary | ICD-10-CM

## 2025-08-11 DIAGNOSIS — M48.02 CERVICAL STENOSIS OF SPINAL CANAL: ICD-10-CM

## 2025-08-11 DIAGNOSIS — G20.B2 PARKINSON'S DISEASE WITH DYSKINESIA AND FLUCTUATING MANIFESTATIONS (H): ICD-10-CM

## 2025-08-11 DIAGNOSIS — Z01.818 PRE-OP EVALUATION: ICD-10-CM

## 2025-08-11 LAB
ANION GAP SERPL CALCULATED.3IONS-SCNC: 11 MMOL/L (ref 7–15)
BLOOD BANK CHART COMMENT: NORMAL
BUN SERPL-MCNC: 20 MG/DL (ref 8–23)
CALCIUM SERPL-MCNC: 9.1 MG/DL (ref 8.8–10.4)
CHLORIDE SERPL-SCNC: 100 MMOL/L (ref 98–107)
CREAT SERPL-MCNC: 0.58 MG/DL (ref 0.67–1.17)
EGFRCR SERPLBLD CKD-EPI 2021: >90 ML/MIN/1.73M2
ERYTHROCYTE [DISTWIDTH] IN BLOOD BY AUTOMATED COUNT: 12.9 % (ref 10–15)
GLUCOSE SERPL-MCNC: 100 MG/DL (ref 70–99)
HCO3 SERPL-SCNC: 24 MMOL/L (ref 22–29)
HCT VFR BLD AUTO: 42.3 % (ref 40–53)
HGB BLD-MCNC: 14 G/DL (ref 13.3–17.7)
MCH RBC QN AUTO: 32.7 PG (ref 26.5–33)
MCHC RBC AUTO-ENTMCNC: 33.1 G/DL (ref 31.5–36.5)
MCV RBC AUTO: 99 FL (ref 78–100)
PLATELET # BLD AUTO: 195 10E3/UL (ref 150–450)
POTASSIUM SERPL-SCNC: 4.3 MMOL/L (ref 3.4–5.3)
RBC # BLD AUTO: 4.28 10E6/UL (ref 4.4–5.9)
SODIUM SERPL-SCNC: 135 MMOL/L (ref 135–145)
SPECIMEN EXP DATE BLD: NORMAL
WBC # BLD AUTO: 5.6 10E3/UL (ref 4–11)

## 2025-08-11 PROCEDURE — 86850 RBC ANTIBODY SCREEN: CPT

## 2025-08-11 PROCEDURE — 80048 BASIC METABOLIC PNL TOTAL CA: CPT | Performed by: PATHOLOGY

## 2025-08-11 PROCEDURE — 99203 OFFICE O/P NEW LOW 30 MIN: CPT | Performed by: PHYSICIAN ASSISTANT

## 2025-08-11 PROCEDURE — 85027 COMPLETE CBC AUTOMATED: CPT | Performed by: PATHOLOGY

## 2025-08-11 PROCEDURE — 36415 COLL VENOUS BLD VENIPUNCTURE: CPT | Performed by: PATHOLOGY

## 2025-08-11 RX ORDER — TADALAFIL 20 MG/1
20 TABLET ORAL DAILY PRN
COMMUNITY
Start: 2025-03-23

## 2025-08-11 ASSESSMENT — ENCOUNTER SYMPTOMS: SEIZURES: 0

## 2025-08-11 ASSESSMENT — LIFESTYLE VARIABLES: TOBACCO_USE: 0

## 2025-08-11 ASSESSMENT — PAIN SCALES - GENERAL: PAINLEVEL_OUTOF10: MODERATE PAIN (5)

## 2025-08-12 ENCOUNTER — PATIENT OUTREACH (OUTPATIENT)
Dept: CARE COORDINATION | Facility: CLINIC | Age: 70
End: 2025-08-12
Payer: COMMERCIAL

## 2025-08-13 ENCOUNTER — PRE VISIT (OUTPATIENT)
Dept: SURGERY | Facility: CLINIC | Age: 70
End: 2025-08-13

## 2025-08-14 ENCOUNTER — PATIENT OUTREACH (OUTPATIENT)
Dept: CARE COORDINATION | Facility: CLINIC | Age: 70
End: 2025-08-14
Payer: COMMERCIAL

## 2025-08-16 ENCOUNTER — HEALTH MAINTENANCE LETTER (OUTPATIENT)
Age: 70
End: 2025-08-16

## 2025-08-27 ENCOUNTER — TELEPHONE (OUTPATIENT)
Dept: ORTHOPEDICS | Facility: CLINIC | Age: 70
End: 2025-08-27
Payer: COMMERCIAL

## 2025-08-28 ENCOUNTER — HOSPITAL ENCOUNTER (INPATIENT)
Facility: CLINIC | Age: 70
DRG: 473 | End: 2025-08-28
Attending: ORTHOPAEDIC SURGERY | Admitting: ORTHOPAEDIC SURGERY
Payer: COMMERCIAL

## 2025-08-28 ENCOUNTER — APPOINTMENT (OUTPATIENT)
Dept: GENERAL RADIOLOGY | Facility: CLINIC | Age: 70
DRG: 473 | End: 2025-08-28
Attending: ORTHOPAEDIC SURGERY
Payer: COMMERCIAL

## 2025-08-28 PROBLEM — Z98.1 S/P CERVICAL SPINAL FUSION: Status: ACTIVE | Noted: 2025-08-28

## 2025-08-28 PROCEDURE — 999N000180 XR SURGERY CARM FLUORO LESS THAN 5 MIN

## 2025-08-28 ASSESSMENT — ACTIVITIES OF DAILY LIVING (ADL)
ADLS_ACUITY_SCORE: 36
ADLS_ACUITY_SCORE: 36
ADLS_ACUITY_SCORE: 39
ADLS_ACUITY_SCORE: 39
ADLS_ACUITY_SCORE: 51
ADLS_ACUITY_SCORE: 36
ADLS_ACUITY_SCORE: 39
ADLS_ACUITY_SCORE: 39
ADLS_ACUITY_SCORE: 36
ADLS_ACUITY_SCORE: 37
ADLS_ACUITY_SCORE: 51
ADLS_ACUITY_SCORE: 39
ADLS_ACUITY_SCORE: 36
ADLS_ACUITY_SCORE: 39
ADLS_ACUITY_SCORE: 36

## 2025-08-29 ENCOUNTER — APPOINTMENT (OUTPATIENT)
Dept: OCCUPATIONAL THERAPY | Facility: CLINIC | Age: 70
DRG: 473 | End: 2025-08-29
Attending: ORTHOPAEDIC SURGERY
Payer: COMMERCIAL

## 2025-08-29 ENCOUNTER — APPOINTMENT (OUTPATIENT)
Dept: GENERAL RADIOLOGY | Facility: CLINIC | Age: 70
DRG: 473 | End: 2025-08-29
Attending: NURSE PRACTITIONER
Payer: COMMERCIAL

## 2025-08-29 ENCOUNTER — APPOINTMENT (OUTPATIENT)
Dept: SPEECH THERAPY | Facility: CLINIC | Age: 70
DRG: 473 | End: 2025-08-29
Attending: PHYSICIAN ASSISTANT
Payer: COMMERCIAL

## 2025-08-29 PROBLEM — G89.18 ACUTE POST-OPERATIVE PAIN: Status: ACTIVE | Noted: 2025-08-29

## 2025-08-29 PROCEDURE — 72040 X-RAY EXAM NECK SPINE 2-3 VW: CPT

## 2025-08-29 PROCEDURE — 72040 X-RAY EXAM NECK SPINE 2-3 VW: CPT | Mod: 26 | Performed by: RADIOLOGY

## 2025-08-29 ASSESSMENT — ACTIVITIES OF DAILY LIVING (ADL)
ADLS_ACUITY_SCORE: 52
ADLS_ACUITY_SCORE: 51
ADLS_ACUITY_SCORE: 52
ADLS_ACUITY_SCORE: 51
ADLS_ACUITY_SCORE: 52
ADLS_ACUITY_SCORE: 51
ADLS_ACUITY_SCORE: 51
ADLS_ACUITY_SCORE: 52

## 2025-09-02 DIAGNOSIS — M54.12 CERVICAL RADICULOPATHY: ICD-10-CM

## 2025-09-02 DIAGNOSIS — M48.02 CERVICAL STENOSIS OF SPINAL CANAL: ICD-10-CM

## 2025-09-02 DIAGNOSIS — Z98.1 S/P CERVICAL SPINAL FUSION: ICD-10-CM

## 2025-09-02 RX ORDER — OXYCODONE HYDROCHLORIDE 5 MG/1
5-10 TABLET ORAL EVERY 4 HOURS PRN
Qty: 35 TABLET | Refills: 0 | Status: SHIPPED | OUTPATIENT
Start: 2025-09-02

## (undated) DEVICE — STPL RELOAD REG/THK TISSUE ECHELON 60 X 3.8MM GOLD GST60D

## (undated) DEVICE — GLOVE BIOGEL PI MICRO SZ 7.5 48575

## (undated) DEVICE — DRAPE IOBAN INCISE 23X17" 6650EZ

## (undated) DEVICE — CATH THORACIC 24FR STR SLICONE 14024

## (undated) DEVICE — ESU CORD MONOPOLAR 10'  E0510

## (undated) DEVICE — PREP CHLORAPREP 26ML TINTED ORANGE  260815

## (undated) DEVICE — TAPE DRSG UNIVERSAL CLOTH 3" WHITE LATEX 881-3

## (undated) DEVICE — GLOVE PROTEXIS W/NEU-THERA 6.5  2D73TE65

## (undated) DEVICE — SU NDL CUT REV MED 3/8 209014

## (undated) DEVICE — GLOVE PROTEXIS W/NEU-THERA 7.5  2D73TE75

## (undated) DEVICE — SOL WATER IRRIG 1000ML BOTTLE 2F7114

## (undated) DEVICE — GOWN IMPERVIOUS ZONED LG

## (undated) DEVICE — DRAPE BREAST/CHEST 29420

## (undated) DEVICE — BLADE KNIFE SURG 15 371115

## (undated) DEVICE — ESU GROUND PAD UNIVERSAL W/O CORD

## (undated) DEVICE — SUCTION CANISTER MEDIVAC LINER 3000ML W/LID 65651-530

## (undated) DEVICE — SU VICRYL 4-0 PS-2 18" UND J496H

## (undated) DEVICE — SYR 07ML EPIDURAL LOSS OF RESISTANCE PULSATOR 4905

## (undated) DEVICE — ANTIFOG SOLUTION W/FOAM PAD 31142527

## (undated) DEVICE — DRSG GAUZE 4X4" 3033

## (undated) DEVICE — NDL 22GA 1.5"

## (undated) DEVICE — TRAY PAIN INJECTION 97A 640

## (undated) DEVICE — STPL ENDO ARTICULATING 60MM EC60A

## (undated) DEVICE — ENDO TROCAR THORACIC 10/12MM TT012

## (undated) DEVICE — LINEN TOWEL PACK X5 5464

## (undated) DEVICE — DRSG KERLIX 4 1/2"X4YDS ROLL 6715

## (undated) DEVICE — NDL 25GA 1.5" 305127

## (undated) DEVICE — BLADE KNIFE SURG 10 371110

## (undated) DEVICE — DRAPE POUCH INSTRUMENT 1018

## (undated) DEVICE — NDL EPIDURAL TUOHY 22GA 3.5" 4911-22

## (undated) DEVICE — ENDO POUCH UNIV RETRIEVAL SYSTEM INZII 10MM CD001

## (undated) DEVICE — SU SILK 2 REEL 60" SA8H

## (undated) DEVICE — SU VICRYL 1 CT-2 27" J335H

## (undated) DEVICE — PREP CHLORAPREP W/ORANGE TINT 10.5ML 260715

## (undated) DEVICE — SU VICRYL 2-0 CT-2 27" J333H

## (undated) DEVICE — LINEN GOWN OVERSIZE 5408

## (undated) DEVICE — TUBING SUCTION MEDI-VAC SOFT 3/16"X20' N520A

## (undated) DEVICE — PACK MINOR SBA15MIFSE

## (undated) RX ORDER — BUPIVACAINE HYDROCHLORIDE 5 MG/ML
INJECTION, SOLUTION EPIDURAL; INTRACAUDAL
Status: DISPENSED
Start: 2019-04-16

## (undated) RX ORDER — FENTANYL CITRATE 50 UG/ML
INJECTION, SOLUTION INTRAMUSCULAR; INTRAVENOUS
Status: DISPENSED
Start: 2019-04-16

## (undated) RX ORDER — CEFAZOLIN SODIUM 2 G/100ML
INJECTION, SOLUTION INTRAVENOUS
Status: DISPENSED
Start: 2019-04-16

## (undated) RX ORDER — HYDROMORPHONE HYDROCHLORIDE 1 MG/ML
INJECTION, SOLUTION INTRAMUSCULAR; INTRAVENOUS; SUBCUTANEOUS
Status: DISPENSED
Start: 2019-04-16

## (undated) RX ORDER — KETOROLAC TROMETHAMINE 30 MG/ML
INJECTION, SOLUTION INTRAMUSCULAR; INTRAVENOUS
Status: DISPENSED
Start: 2019-04-16